# Patient Record
Sex: FEMALE | Race: WHITE | NOT HISPANIC OR LATINO | Employment: OTHER | URBAN - METROPOLITAN AREA
[De-identification: names, ages, dates, MRNs, and addresses within clinical notes are randomized per-mention and may not be internally consistent; named-entity substitution may affect disease eponyms.]

---

## 2023-08-25 ENCOUNTER — HOSPITAL ENCOUNTER (OUTPATIENT)
Dept: DATA CONVERSION | Facility: HOSPITAL | Age: 78
Discharge: HOME | End: 2023-08-25

## 2023-08-25 DIAGNOSIS — R05.9 COUGH, UNSPECIFIED: ICD-10-CM

## 2024-01-22 ENCOUNTER — APPOINTMENT (OUTPATIENT)
Dept: PRIMARY CARE | Facility: CLINIC | Age: 79
End: 2024-01-22
Payer: MEDICARE

## 2024-01-24 ENCOUNTER — OFFICE VISIT (OUTPATIENT)
Dept: PRIMARY CARE | Facility: CLINIC | Age: 79
End: 2024-01-24
Payer: MEDICARE

## 2024-01-24 VITALS
HEIGHT: 63 IN | WEIGHT: 232 LBS | DIASTOLIC BLOOD PRESSURE: 80 MMHG | HEART RATE: 82 BPM | BODY MASS INDEX: 41.11 KG/M2 | OXYGEN SATURATION: 98 % | SYSTOLIC BLOOD PRESSURE: 134 MMHG

## 2024-01-24 DIAGNOSIS — E78.2 MIXED HYPERLIPIDEMIA: ICD-10-CM

## 2024-01-24 DIAGNOSIS — Z78.0 POSTMENOPAUSAL: ICD-10-CM

## 2024-01-24 DIAGNOSIS — Z11.59 ENCOUNTER FOR HEPATITIS C SCREENING TEST FOR LOW RISK PATIENT: ICD-10-CM

## 2024-01-24 DIAGNOSIS — E11.9 TYPE 2 DIABETES MELLITUS WITHOUT COMPLICATION, WITHOUT LONG-TERM CURRENT USE OF INSULIN (MULTI): ICD-10-CM

## 2024-01-24 DIAGNOSIS — Z12.11 ENCOUNTER FOR SCREENING FOR MALIGNANT NEOPLASM OF COLON: ICD-10-CM

## 2024-01-24 DIAGNOSIS — I10 PRIMARY HYPERTENSION: ICD-10-CM

## 2024-01-24 DIAGNOSIS — Z00.00 WELL ADULT EXAM: Primary | ICD-10-CM

## 2024-01-24 DIAGNOSIS — Z12.31 ENCOUNTER FOR SCREENING MAMMOGRAM FOR BREAST CANCER: ICD-10-CM

## 2024-01-24 PROCEDURE — 1159F MED LIST DOCD IN RCRD: CPT | Performed by: NURSE PRACTITIONER

## 2024-01-24 PROCEDURE — 3075F SYST BP GE 130 - 139MM HG: CPT | Performed by: NURSE PRACTITIONER

## 2024-01-24 PROCEDURE — 93000 ELECTROCARDIOGRAM COMPLETE: CPT | Performed by: NURSE PRACTITIONER

## 2024-01-24 PROCEDURE — 3079F DIAST BP 80-89 MM HG: CPT | Performed by: NURSE PRACTITIONER

## 2024-01-24 PROCEDURE — 1126F AMNT PAIN NOTED NONE PRSNT: CPT | Performed by: NURSE PRACTITIONER

## 2024-01-24 PROCEDURE — G0439 PPPS, SUBSEQ VISIT: HCPCS | Performed by: NURSE PRACTITIONER

## 2024-01-24 PROCEDURE — 1036F TOBACCO NON-USER: CPT | Performed by: NURSE PRACTITIONER

## 2024-01-24 RX ORDER — GLIMEPIRIDE 4 MG/1
4 TABLET ORAL EVERY 12 HOURS
COMMUNITY
End: 2024-02-13

## 2024-01-24 RX ORDER — PRAVASTATIN SODIUM 80 MG/1
80 TABLET ORAL DAILY
COMMUNITY
End: 2024-02-16 | Stop reason: SDUPTHER

## 2024-01-24 RX ORDER — SERTRALINE HYDROCHLORIDE 100 MG/1
200 TABLET, FILM COATED ORAL DAILY
COMMUNITY
Start: 2023-04-04 | End: 2024-02-13

## 2024-01-24 RX ORDER — HUMAN INSULIN 100 [IU]/ML
INJECTION, SUSPENSION SUBCUTANEOUS EVERY 24 HOURS
COMMUNITY
End: 2024-02-16 | Stop reason: ALTCHOICE

## 2024-01-24 ASSESSMENT — PATIENT HEALTH QUESTIONNAIRE - PHQ9
1. LITTLE INTEREST OR PLEASURE IN DOING THINGS: NOT AT ALL
SUM OF ALL RESPONSES TO PHQ9 QUESTIONS 1 AND 2: 0
2. FEELING DOWN, DEPRESSED OR HOPELESS: NOT AT ALL

## 2024-01-24 ASSESSMENT — PAIN SCALES - GENERAL: PAINLEVEL: 0-NO PAIN

## 2024-01-24 NOTE — PROGRESS NOTES
Subjective   Patient ID: Denia Tong is a 78 y.o. female who presents for Annual Exam (Cologuard 2018 negative. EKG 2020. ).    HPI   Presents for Annual Wellness Visit. PMHx, FMHx, SHx, and Social history reviewed and updated in chart. PHQ2 reviewed. Mini-cog test reviewed. Advanced Care planning reviewed.  Self assessment of Health - fair  Issues with ADLs - none  Issues with IADLs - none  Issues with home safety - none  Pt has hx of noncompliance - out of meds for past week  Hx of HTN, HPL, DM, depression    Diet is fair  does not exercise  works at [employer]    Hx of HTN/HPL/DM  Denies chest pain, sob, claudication  former smoker - cxr 2020. Refuses LDCT  checks glucose periodically  Eye exam Dr champagne    GYN: pap no longer indicated due to age  overdue for mammogram and dexa    Cologuard negative 4/18  overdue for repeat    Immunizations reviewed    No predraw bw     Review of Systems  Constitutional Symptoms: negative for fever, loss of appetite, headaches, fatigue.   Eyes: negative for loss and blurring of vision, double vision.   Ear, Nose, Mouth, Throat: negative for hearing loss, tinnitus, nasal congestion, rhinorrhea, nose bleeds, teeth problems, mouth sores, gum disease, dysphagia, sore throat.   Cardiovascular: negative for chest pain/pressure, palpitations, edema, claudication.   Respiratory: negative for shortness of breath, dyspnea on exertion, pain with breathing, coughing.   Breast: negative for tenderness, masses, gynecomastia.   Gastrointestinal: negative for anorexia, indigestion, nausea, vomiting, abdominal pain, change in bowel habits, diarrhea, constipation, hematochaezia, melena, blood in stool.    : Negative for urinary or vaginal complaints  Musculoskeletal: negative for joint pain, joint swelling, myalgias, cramps.   Integumentary: negative for change in mole, skin trouble or rash.   Neurological: negative for headache, numbness, tingling, weakness, tremors.   Psychiatric:  "negative for depression, anxiety.   Endocrine: negative for weight gain, heat or cold intolerance, polyuria, polydipsia, polyphagia.   Hematologic/Lymphatic: negative for bruising, abnormal bleeding, swollen glands.      Objective   /80   Pulse 82   Ht 1.6 m (5' 3\")   Wt 105 kg (232 lb)   LMP 01/01/1990 (Approximate)   SpO2 98%   BMI 41.10 kg/m²     Physical Exam    General Appearance: Comfortable. She is well nourished, and well developed. She is awake, alert, and oriented and appears her stated age. The patient is cooperative with exam.  Head: Hair pattern reveals a normal pattern for patient's age and The face shows no abnormalities.  Eyes: PERRLA, EOMI, conjunctiva and sclera clear. Extraocular muscle exam reveals EOMI.  Ears, Nose, Mouth, Throat: Bilateral canals are normal. Both tympanic membranes are pearly gray and landmarks normal.   NOSE: Nasal mucosa in both nostrils reveals no polyps, ulcerations, or lesions. Oral mucosa reveals no abnormalities and Teeth fair  Neck: Neck reveals supple, no adenopathy, no thyromegaly, or carotid bruits.  Chest: Lungs are clear to auscultation bilaterally with no wheezes, rales, or rhonchi.  Cardiovascular: RRR without MRG.  Breast: Defers  Abdomen: Abdomen is soft, NT, ND with no masses.  Genitourinary: Defers  Lymph Nodes: Bilateral axillary lymph nodes are unremarkable. Bilateral inguinal lymph nodes are unremarkable.  Musculoskeletal: 5/5 and equal strength in bilateral upper and lower extremities.  Skin: Skin reveals good turgor and no rashes.  Neurological: Neuro: Intact and non-focal. Cranial nerves II - XII are grossly intact.  Psychiatric: Patient has appropriate judgement. Patient has good insight. Patient's mood is appropriate    Assessment/Plan   Diagnoses and all orders for this visit:  Well adult exam  Continue current medications  healthy diet  DASH diet  exercise  Safety  Bw ordered  Dexa and mammogram ordered   Cologuard ordered  Await " results    Encounter for screening mammogram for breast cancer  -     CBC and Auto Differential; Future  -     Comprehensive Metabolic Panel; Future  -     Hemoglobin A1C; Future  -     Lipid Panel; Future  -     Urinalysis with Reflex Microscopic; Future  -     Albumin , Urine Random; Future  -     BI mammo bilateral screening tomosynthesis; Future    Postmenopausal  -     CBC and Auto Differential; Future  -     Comprehensive Metabolic Panel; Future  -     Hemoglobin A1C; Future  -     Lipid Panel; Future  -     Urinalysis with Reflex Microscopic; Future  -     Albumin , Urine Random; Future  -     XR DEXA bone density; Future  Type 2 diabetes mellitus without complication, without long-term current use of insulin (CMS/HCC)  -     CBC and Auto Differential; Future  -     Comprehensive Metabolic Panel; Future  -     Hemoglobin A1C; Future  -     Lipid Panel; Future  -     Urinalysis with Reflex Microscopic; Future  -     Albumin , Urine Random; Future  Mixed hyperlipidemia  -     CBC and Auto Differential; Future  -     Comprehensive Metabolic Panel; Future  -     Hemoglobin A1C; Future  -     Lipid Panel; Future  -     Urinalysis with Reflex Microscopic; Future  -     Albumin , Urine Random; Future  Primary hypertension  -     CBC and Auto Differential; Future  -     Comprehensive Metabolic Panel; Future  -     Hemoglobin A1C; Future  -     Lipid Panel; Future  -     Urinalysis with Reflex Microscopic; Future  -     Albumin , Urine Random; Future  -     ECG 12 Lead  Encounter for hepatitis C screening test for low risk patient  -     Hepatitis C antibody; Future  Agrees to testing  Encounter for screening for malignant neoplasm of colon  -     Cologuard® colon cancer screening; Future  Other orders  -     Follow Up In Primary Care - Other; Future

## 2024-02-12 ENCOUNTER — HOSPITAL ENCOUNTER (OUTPATIENT)
Dept: RADIOLOGY | Facility: CLINIC | Age: 79
End: 2024-02-12
Payer: MEDICARE

## 2024-02-12 ENCOUNTER — APPOINTMENT (OUTPATIENT)
Dept: RADIOLOGY | Facility: CLINIC | Age: 79
End: 2024-02-12
Payer: MEDICARE

## 2024-02-14 ENCOUNTER — HOSPITAL ENCOUNTER (OUTPATIENT)
Dept: RADIOLOGY | Facility: CLINIC | Age: 79
Discharge: HOME | End: 2024-02-14
Payer: MEDICARE

## 2024-02-14 VITALS — BODY MASS INDEX: 41.96 KG/M2 | HEIGHT: 62 IN | WEIGHT: 228 LBS

## 2024-02-14 DIAGNOSIS — Z12.31 ENCOUNTER FOR SCREENING MAMMOGRAM FOR BREAST CANCER: ICD-10-CM

## 2024-02-14 DIAGNOSIS — Z78.0 POSTMENOPAUSAL: ICD-10-CM

## 2024-02-14 PROCEDURE — 77067 SCR MAMMO BI INCL CAD: CPT

## 2024-02-14 PROCEDURE — 77085 DXA BONE DENSITY AXL VRT FX: CPT

## 2024-02-15 ENCOUNTER — LAB (OUTPATIENT)
Dept: LAB | Facility: LAB | Age: 79
End: 2024-02-15
Payer: MEDICARE

## 2024-02-15 DIAGNOSIS — Z11.59 ENCOUNTER FOR HEPATITIS C SCREENING TEST FOR LOW RISK PATIENT: ICD-10-CM

## 2024-02-15 DIAGNOSIS — I10 PRIMARY HYPERTENSION: ICD-10-CM

## 2024-02-15 DIAGNOSIS — Z78.0 POSTMENOPAUSAL: ICD-10-CM

## 2024-02-15 DIAGNOSIS — E78.2 MIXED HYPERLIPIDEMIA: ICD-10-CM

## 2024-02-15 DIAGNOSIS — E11.9 TYPE 2 DIABETES MELLITUS WITHOUT COMPLICATION, WITHOUT LONG-TERM CURRENT USE OF INSULIN (MULTI): ICD-10-CM

## 2024-02-15 DIAGNOSIS — Z12.31 ENCOUNTER FOR SCREENING MAMMOGRAM FOR BREAST CANCER: ICD-10-CM

## 2024-02-15 LAB
ALBUMIN SERPL-MCNC: 3.8 G/DL (ref 3.5–5)
ALP BLD-CCNC: 91 U/L (ref 35–125)
ALT SERPL-CCNC: 5 U/L (ref 5–40)
ANION GAP SERPL CALC-SCNC: 11 MMOL/L
APPEARANCE UR: CLEAR
AST SERPL-CCNC: 11 U/L (ref 5–40)
BASOPHILS # BLD AUTO: 0.06 X10*3/UL (ref 0–0.1)
BASOPHILS NFR BLD AUTO: 0.8 %
BILIRUB SERPL-MCNC: 0.3 MG/DL (ref 0.1–1.2)
BILIRUB UR STRIP.AUTO-MCNC: NEGATIVE MG/DL
BUN SERPL-MCNC: 17 MG/DL (ref 8–25)
CALCIUM SERPL-MCNC: 9.2 MG/DL (ref 8.5–10.4)
CHLORIDE SERPL-SCNC: 105 MMOL/L (ref 97–107)
CHOLEST SERPL-MCNC: 210 MG/DL (ref 133–200)
CHOLEST/HDLC SERPL: 4.9 {RATIO}
CO2 SERPL-SCNC: 27 MMOL/L (ref 24–31)
COLOR UR: NORMAL
CREAT SERPL-MCNC: 0.9 MG/DL (ref 0.4–1.6)
CREAT UR-MCNC: 102.6 MG/DL
EGFRCR SERPLBLD CKD-EPI 2021: 65 ML/MIN/1.73M*2
EOSINOPHIL # BLD AUTO: 0.2 X10*3/UL (ref 0–0.4)
EOSINOPHIL NFR BLD AUTO: 2.7 %
ERYTHROCYTE [DISTWIDTH] IN BLOOD BY AUTOMATED COUNT: 13.6 % (ref 11.5–14.5)
EST. AVERAGE GLUCOSE BLD GHB EST-MCNC: 237 MG/DL
GLUCOSE SERPL-MCNC: 102 MG/DL (ref 65–99)
GLUCOSE UR STRIP.AUTO-MCNC: NORMAL MG/DL
HBA1C MFR BLD: 9.9 %
HCT VFR BLD AUTO: 41.2 % (ref 36–46)
HCV AB SER QL: NONREACTIVE
HDLC SERPL-MCNC: 43 MG/DL
HGB BLD-MCNC: 13.1 G/DL (ref 12–16)
IMM GRANULOCYTES # BLD AUTO: 0.02 X10*3/UL (ref 0–0.5)
IMM GRANULOCYTES NFR BLD AUTO: 0.3 % (ref 0–0.9)
KETONES UR STRIP.AUTO-MCNC: NEGATIVE MG/DL
LDLC SERPL CALC-MCNC: 143 MG/DL (ref 65–130)
LEUKOCYTE ESTERASE UR QL STRIP.AUTO: NEGATIVE
LYMPHOCYTES # BLD AUTO: 1.85 X10*3/UL (ref 0.8–3)
LYMPHOCYTES NFR BLD AUTO: 24.9 %
MCH RBC QN AUTO: 27.6 PG (ref 26–34)
MCHC RBC AUTO-ENTMCNC: 31.8 G/DL (ref 32–36)
MCV RBC AUTO: 87 FL (ref 80–100)
MICROALBUMIN UR-MCNC: 131 MG/L (ref 0–23)
MICROALBUMIN/CREAT UR: 127.7 UG/MG CREAT
MONOCYTES # BLD AUTO: 0.55 X10*3/UL (ref 0.05–0.8)
MONOCYTES NFR BLD AUTO: 7.4 %
MUCOUS THREADS #/AREA URNS AUTO: ABNORMAL /LPF
NEUTROPHILS # BLD AUTO: 4.75 X10*3/UL (ref 1.6–5.5)
NEUTROPHILS NFR BLD AUTO: 63.9 %
NITRITE UR QL STRIP.AUTO: NEGATIVE
NRBC BLD-RTO: 0 /100 WBCS (ref 0–0)
PH UR STRIP.AUTO: 6.5 [PH]
PLATELET # BLD AUTO: 268 X10*3/UL (ref 150–450)
POTASSIUM SERPL-SCNC: 4.7 MMOL/L (ref 3.4–5.1)
PROT SERPL-MCNC: 6.9 G/DL (ref 5.9–7.9)
PROT UR STRIP.AUTO-MCNC: NORMAL MG/DL
RBC # BLD AUTO: 4.74 X10*6/UL (ref 4–5.2)
RBC # UR STRIP.AUTO: NEGATIVE /UL
RBC #/AREA URNS AUTO: ABNORMAL /HPF
SODIUM SERPL-SCNC: 143 MMOL/L (ref 133–145)
SP GR UR STRIP.AUTO: 1.02
SQUAMOUS #/AREA URNS AUTO: ABNORMAL /HPF
TRIGL SERPL-MCNC: 121 MG/DL (ref 40–150)
UROBILINOGEN UR STRIP.AUTO-MCNC: NORMAL MG/DL
WBC # BLD AUTO: 7.4 X10*3/UL (ref 4.4–11.3)
WBC #/AREA URNS AUTO: ABNORMAL /HPF

## 2024-02-15 PROCEDURE — 36415 COLL VENOUS BLD VENIPUNCTURE: CPT

## 2024-02-15 PROCEDURE — 82570 ASSAY OF URINE CREATININE: CPT

## 2024-02-15 PROCEDURE — 83036 HEMOGLOBIN GLYCOSYLATED A1C: CPT

## 2024-02-15 PROCEDURE — 81001 URINALYSIS AUTO W/SCOPE: CPT

## 2024-02-15 PROCEDURE — 80061 LIPID PANEL: CPT

## 2024-02-15 PROCEDURE — 82043 UR ALBUMIN QUANTITATIVE: CPT

## 2024-02-15 PROCEDURE — 86803 HEPATITIS C AB TEST: CPT

## 2024-02-15 PROCEDURE — 85025 COMPLETE CBC W/AUTO DIFF WBC: CPT

## 2024-02-15 PROCEDURE — 80053 COMPREHEN METABOLIC PANEL: CPT

## 2024-02-16 ENCOUNTER — OFFICE VISIT (OUTPATIENT)
Dept: PRIMARY CARE | Facility: CLINIC | Age: 79
End: 2024-02-16
Payer: MEDICARE

## 2024-02-16 ENCOUNTER — TELEPHONE (OUTPATIENT)
Dept: PRIMARY CARE | Facility: CLINIC | Age: 79
End: 2024-02-16

## 2024-02-16 ENCOUNTER — CLINICAL SUPPORT (OUTPATIENT)
Dept: PRIMARY CARE | Facility: CLINIC | Age: 79
End: 2024-02-16
Payer: MEDICARE

## 2024-02-16 VITALS
WEIGHT: 234 LBS | DIASTOLIC BLOOD PRESSURE: 78 MMHG | OXYGEN SATURATION: 96 % | BODY MASS INDEX: 42.8 KG/M2 | HEART RATE: 74 BPM | SYSTOLIC BLOOD PRESSURE: 144 MMHG

## 2024-02-16 DIAGNOSIS — E11.9 TYPE 2 DIABETES MELLITUS WITHOUT COMPLICATION, WITH LONG-TERM CURRENT USE OF INSULIN (MULTI): ICD-10-CM

## 2024-02-16 DIAGNOSIS — Z79.4 TYPE 2 DIABETES MELLITUS WITH DIABETIC NEUROPATHY, WITH LONG-TERM CURRENT USE OF INSULIN (MULTI): Primary | ICD-10-CM

## 2024-02-16 DIAGNOSIS — E11.40 TYPE 2 DIABETES MELLITUS WITH DIABETIC NEUROPATHY, WITH LONG-TERM CURRENT USE OF INSULIN (MULTI): Primary | ICD-10-CM

## 2024-02-16 DIAGNOSIS — Z79.4 TYPE 2 DIABETES MELLITUS WITHOUT COMPLICATION, WITH LONG-TERM CURRENT USE OF INSULIN (MULTI): ICD-10-CM

## 2024-02-16 DIAGNOSIS — E11.9 TYPE 2 DIABETES MELLITUS WITHOUT COMPLICATION, UNSPECIFIED WHETHER LONG TERM INSULIN USE (MULTI): ICD-10-CM

## 2024-02-16 DIAGNOSIS — M85.852 OSTEOPENIA OF LEFT HIP: Primary | ICD-10-CM

## 2024-02-16 DIAGNOSIS — I10 PRIMARY HYPERTENSION: ICD-10-CM

## 2024-02-16 PROCEDURE — 1036F TOBACCO NON-USER: CPT | Performed by: NURSE PRACTITIONER

## 2024-02-16 PROCEDURE — 1126F AMNT PAIN NOTED NONE PRSNT: CPT | Performed by: NURSE PRACTITIONER

## 2024-02-16 PROCEDURE — 1158F ADVNC CARE PLAN TLK DOCD: CPT | Performed by: NURSE PRACTITIONER

## 2024-02-16 PROCEDURE — 3077F SYST BP >= 140 MM HG: CPT | Performed by: NURSE PRACTITIONER

## 2024-02-16 PROCEDURE — 1123F ACP DISCUSS/DSCN MKR DOCD: CPT | Performed by: NURSE PRACTITIONER

## 2024-02-16 PROCEDURE — 1159F MED LIST DOCD IN RCRD: CPT | Performed by: NURSE PRACTITIONER

## 2024-02-16 PROCEDURE — 3078F DIAST BP <80 MM HG: CPT | Performed by: NURSE PRACTITIONER

## 2024-02-16 PROCEDURE — 99214 OFFICE O/P EST MOD 30 MIN: CPT | Performed by: NURSE PRACTITIONER

## 2024-02-16 RX ORDER — ALENDRONATE SODIUM 70 MG/1
70 TABLET ORAL
Qty: 4 TABLET | Refills: 11 | Status: SHIPPED | OUTPATIENT
Start: 2024-02-16 | End: 2025-02-15

## 2024-02-16 ASSESSMENT — PAIN SCALES - GENERAL: PAINLEVEL: 0-NO PAIN

## 2024-02-16 NOTE — PROGRESS NOTES
Diabetes Management  E11.9    HPI  Denia Tong is a 78 y.o. female who presents for a follow-up evaluation of her Type 2 diabetes mellitus.   Referring Provider: Britany Dudley, JANKI-CNP     Pt reports neuropathy in feet and lower legs.  Duration 6 months.      Pt reports no fridge for 3 years.  She works at [employer] as a .  She reports that she typically skips breakfast and eats other meals at [employer] daily.  When not at work she eats prepackaged meals.    Previously ordered Libre2 reader and sensors, however pt never followed up for CGM start appointment.  Pt states that she can't find where she placed the supplies.  She brings no supplies with her today.      CURRENT DM PHARMACOTHERAPY  Toujeo 50 units daily   Glimepiride 4mg bid    LAB REVIEW   Lab Results   Component Value Date    HGBA1C 9.9 (H) 02/15/2024    HGBA1C 8.6 (H) 05/26/2023    HGBA1C 10.5 (H) 08/05/2020     Lab Results   Component Value Date    CREATININE 0.90 02/15/2024    GLUCOSE 102 (H) 02/15/2024    EGFR 65 02/15/2024     Lab Results   Component Value Date    TRIG 121 02/15/2024    CHOL 210 (H) 02/15/2024    LDLCALC 143 (H) 02/15/2024    HDL 43.0 (L) 02/15/2024       HISTORICAL PHARMACOTHERAPY  Current Outpatient Medications on File Prior to Visit   Medication Sig Dispense Refill    glimepiride (Amaryl) 4 mg tablet Take 1 tablet by mouth twice daily 180 tablet 0    insulin glargine (Toujeo SoloStar U-300 Insulin) 300 unit/mL (1.5 mL) injection INJECT 50 UNITS SUBCUTANEOUSLY ONCE DAILY 6 mL 3    insulin NPH, Isophane, (NovoLIN N NPH U-100 Insulin) 100 unit/mL injection once every 24 hours.      losartan (Cozaar) 50 mg tablet Take 1 tablet by mouth once daily 90 tablet 0    pravastatin (Pravachol) 40 mg tablet Take 1 tablet by mouth once daily 90 tablet 0    pravastatin (Pravachol) 80 mg tablet Take 1 tablet (80 mg) by mouth once daily.      sertraline (Zoloft) 100 mg tablet Take 2 tablets by mouth once daily 180 tablet 0     [DISCONTINUED] glimepiride (Amaryl) 4 mg tablet Take 1 tablet (4 mg) by mouth every 12 hours.      [DISCONTINUED] sertraline (Zoloft) 100 mg tablet Take 2 tablets (200 mg) by mouth once daily.       No current facility-administered medications on file prior to visit.       SMBG  Not testing at home     RECOMMENDATIONS/PLAN  Pt diabetes is poorly controlled with most recent A1c of 9.9% (goal < 7 %).   Uncertain if patient is compliant with medications. She is unable to tell me how many units of insulin she uses daily.  She believes it might be 50 units.   2.    CGM being the best way to determine pt need for insulin.  Pt will look at home and see if she can find the supplies.  Instructed to contact me if supplies are not located.  I can attempt to order replacements.    3.  Discussed reduction of CHO foods in diet.  Instructed pt to avoid sugary drinks and french fries. Provided pt with list of menu items from [employer] that contain <45g carbs.        Plan:  Call office to schedule appt with clinical pharmacist once supplies are located.    Reduce carbohydrates in diet.        Treatment and plan discussed with ISIDRO LEVINE [facility], [facility], [facility]    Verbal consent to manage patient's drug therapy was obtained from the patient or an individual authorized to act on behalf of the patient.  Patient was informed they may decline to participate or withdraw from participation in pharmacy services at any time.

## 2024-02-22 NOTE — PROGRESS NOTES
Subjective   Patient ID: Denia Tong is a 78 y.o. female who presents for Follow-up (Mammogram, DEXA, and lab results. ).    HPI   Denia is a 79 yo F who presents for testing follow up  She met with Clinical pharmacist Lilliana regarding DM prior to this visit.  She will be intiated on CGM and diet was reviewed. Pt will be more compliant with her meds.     Mammogram done and normal    Dexa shows osteopenia of left hip  T4-7 shows mild wedging  10 year fracture risk for hip 3.1%    Review of Systems  Constitutional Symptoms: Negative for fever, loss of appetite, headaches, fatigue.   Cardiovascular: Negative for chest pain/pressure, palpitations, edema  Respiratory: Negative for shortness of breath, dyspnea on exertion, pain with breathing, coughing.   Gastrointestinal: Negative for nausea, vomiting, abdominal pain, change in bowel habits  Musculoskeletal: Negative for joint pain, joint swelling, myalgias, cramps.   Integumentary: Negative for skin trouble or rash.   Neurological: Negative for headache, numbness, tingling, weakness, tremors.   Psychiatric: Negative for depression, anxiety.   Endocrine: Negative for weight gain, heat or cold intolerance, polyuria, polydipsia, polyphagia.   Hematologic/Lymphatic: Negative for bruising, abnormal bleeding, swollen glands.     Objective   /78   Pulse 74   Wt 106 kg (234 lb)   LMP 01/01/1990 (Approximate)   SpO2 96%   BMI 42.80 kg/m²     Physical Exam  alert and oriented x3, NAD  Neck supple with no JVD  Lungs CTA bilaterally  Heart with RRR with no edema.  Abd obese, soft NT/ND  skin warm and dry  Neuro grossly intact     Assessment/Plan   Diagnoses and all orders for this visit:  Osteopenia of left hip  -     alendronate (Fosamax) 70 mg tablet; Take 1 tablet (70 mg) by mouth every 7 days. Take in the morning with a full glass of water, on an empty stomach, and do not take anything else by mouth or lie down for the next 30 min.  Due to test results and fracture  risk, start meds weekly  Healthy diet, calcium/vit d, limit caffeine and alcohol  Exercise  Recheck 2 years  Type 2 diabetes mellitus without complication, with long-term current use of insulin (CMS/[facility])  Compliance with medications as directed  Diabetic education given  Check glucose  Follow up 1 month  Primary hypertension  Compliance with meds as directed  Mediterranean diet   No added salt  Monitor and record  Follow up 1 month

## 2024-02-27 DIAGNOSIS — R19.5 POSITIVE COLORECTAL CANCER SCREENING USING COLOGUARD TEST: ICD-10-CM

## 2024-02-27 LAB — NONINV COLON CA DNA+OCC BLD SCRN STL QL: POSITIVE

## 2024-03-20 DIAGNOSIS — Z79.4 TYPE 2 DIABETES MELLITUS WITH OTHER SPECIFIED COMPLICATION, WITH LONG-TERM CURRENT USE OF INSULIN (MULTI): ICD-10-CM

## 2024-03-20 DIAGNOSIS — E11.69 TYPE 2 DIABETES MELLITUS WITH OTHER SPECIFIED COMPLICATION, WITH LONG-TERM CURRENT USE OF INSULIN (MULTI): ICD-10-CM

## 2024-03-20 RX ORDER — INSULIN GLARGINE 300 U/ML
50 INJECTION, SOLUTION SUBCUTANEOUS DAILY
Qty: 6 ML | Refills: 0 | Status: SHIPPED | OUTPATIENT
Start: 2024-03-20 | End: 2024-06-03

## 2024-03-22 ENCOUNTER — HOSPITAL ENCOUNTER (OUTPATIENT)
Dept: RADIOLOGY | Facility: EXTERNAL LOCATION | Age: 79
Discharge: HOME | End: 2024-03-22
Payer: MEDICARE

## 2024-03-22 DIAGNOSIS — M25.562 ACUTE PAIN OF LEFT KNEE: ICD-10-CM

## 2024-03-25 PROBLEM — F32.9 MAJOR DEPRESSIVE DISORDER WITH SINGLE EPISODE: Status: ACTIVE | Noted: 2024-03-25

## 2024-03-25 PROBLEM — R05.9 COUGH: Status: ACTIVE | Noted: 2020-09-01

## 2024-03-25 PROBLEM — M17.9 OSTEOARTHRITIS OF KNEE: Status: ACTIVE | Noted: 2023-05-19

## 2024-03-25 PROBLEM — M54.50 LOW BACK PAIN: Status: ACTIVE | Noted: 2022-05-20

## 2024-03-25 PROBLEM — M85.80 OSTEOPENIA: Status: ACTIVE | Noted: 2024-03-25

## 2024-03-25 PROBLEM — Z91.199 NONCOMPLIANCE WITH TREATMENT: Status: ACTIVE | Noted: 2023-05-26

## 2024-03-25 PROBLEM — M65.30 ACQUIRED TRIGGER FINGER: Status: ACTIVE | Noted: 2024-03-25

## 2024-03-25 PROBLEM — S83.90XA SPRAIN OF KNEE: Status: ACTIVE | Noted: 2023-05-19

## 2024-03-25 PROBLEM — M25.569 KNEE PAIN: Status: ACTIVE | Noted: 2023-05-19

## 2024-03-25 RX ORDER — FLASH GLUCOSE SCANNING READER
EACH MISCELLANEOUS
COMMUNITY
Start: 2023-08-25

## 2024-03-25 RX ORDER — FLUTICASONE PROPIONATE 110 UG/1
AEROSOL, METERED RESPIRATORY (INHALATION)
COMMUNITY
Start: 2020-09-01

## 2024-03-25 RX ORDER — ALBUTEROL SULFATE 90 UG/1
AEROSOL, METERED RESPIRATORY (INHALATION)
COMMUNITY
Start: 2023-08-29

## 2024-03-25 RX ORDER — PREDNISOLONE ACETATE 10 MG/ML
SUSPENSION/ DROPS OPHTHALMIC
COMMUNITY
Start: 2024-02-05

## 2024-03-25 RX ORDER — TIMOLOL MALEATE 5 MG/ML
SOLUTION/ DROPS OPHTHALMIC
COMMUNITY
Start: 2024-02-12

## 2024-03-26 ENCOUNTER — OFFICE VISIT (OUTPATIENT)
Dept: ORTHOPEDIC SURGERY | Facility: CLINIC | Age: 79
End: 2024-03-26
Payer: MEDICARE

## 2024-03-26 VITALS — WEIGHT: 233.69 LBS | BODY MASS INDEX: 42.74 KG/M2

## 2024-03-26 DIAGNOSIS — M79.89 LEFT LEG SWELLING: ICD-10-CM

## 2024-03-26 DIAGNOSIS — M71.22 BAKER'S CYST OF KNEE, LEFT: ICD-10-CM

## 2024-03-26 DIAGNOSIS — M17.12 LOCALIZED OSTEOARTHRITIS OF LEFT KNEE: ICD-10-CM

## 2024-03-26 DIAGNOSIS — M25.562 LEFT KNEE PAIN, UNSPECIFIED CHRONICITY: Primary | ICD-10-CM

## 2024-03-26 PROCEDURE — 1160F RVW MEDS BY RX/DR IN RCRD: CPT | Performed by: PHYSICIAN ASSISTANT

## 2024-03-26 PROCEDURE — 1159F MED LIST DOCD IN RCRD: CPT | Performed by: PHYSICIAN ASSISTANT

## 2024-03-26 PROCEDURE — 1036F TOBACCO NON-USER: CPT | Performed by: PHYSICIAN ASSISTANT

## 2024-03-26 PROCEDURE — 99213 OFFICE O/P EST LOW 20 MIN: CPT | Performed by: PHYSICIAN ASSISTANT

## 2024-03-26 PROCEDURE — 1125F AMNT PAIN NOTED PAIN PRSNT: CPT | Performed by: PHYSICIAN ASSISTANT

## 2024-03-26 PROCEDURE — 1123F ACP DISCUSS/DSCN MKR DOCD: CPT | Performed by: PHYSICIAN ASSISTANT

## 2024-03-26 ASSESSMENT — PATIENT HEALTH QUESTIONNAIRE - PHQ9
2. FEELING DOWN, DEPRESSED OR HOPELESS: NOT AT ALL
SUM OF ALL RESPONSES TO PHQ9 QUESTIONS 1 AND 2: 0
1. LITTLE INTEREST OR PLEASURE IN DOING THINGS: NOT AT ALL

## 2024-03-26 ASSESSMENT — PAIN DESCRIPTION - DESCRIPTORS: DESCRIPTORS: ACHING

## 2024-03-26 ASSESSMENT — ENCOUNTER SYMPTOMS
LOSS OF SENSATION IN FEET: 0
DEPRESSION: 0
OCCASIONAL FEELINGS OF UNSTEADINESS: 0

## 2024-03-26 ASSESSMENT — PAIN SCALES - GENERAL: PAINLEVEL_OUTOF10: 8

## 2024-03-26 ASSESSMENT — PAIN - FUNCTIONAL ASSESSMENT: PAIN_FUNCTIONAL_ASSESSMENT: 0-10

## 2024-03-26 NOTE — LETTER
March 26, 2024     Patient: Denia Tong   YOB: 1945   Date of Visit: 3/26/2024       To Whom It May Concern:    It is my medical opinion that Denia Tong may return to work on April 8th 2024 .    If you have any questions or concerns, please don't hesitate to call.         Sincerely,        Loan Contreras PA-C    CC: No Recipients

## 2024-03-26 NOTE — PROGRESS NOTES
Subjective      Chief Complaint   Patient presents with    Left Knee - Pain      No surgery found     HPI  This 78 year old patient presents today with  left knee pain. The patient states that this left knee pain has been present after a twisting injury several days ago. She went to Urgent care as she had difficulty bearing weight and x-rays showed no acute fracture. She is using a walker while walking for support. The patient rates the knee pain as 8. The patient states that knee pain is worse with and aggravated by activity and bearing weight. The patient states the knee is giving way and locking. She is unable to resume her normal activities of daily living including working. The patient has tried ibuprofen and tylenol with no relief. Patient requests a discussion of further treatment options on examination today.     CARDIOLOGY:   Negative for chest pain, shortness of breath.   RESPIRATORY:   Negative for chest pain, shortness of breath.   MUSCULOSKELETAL:   See HPI for details.   NEUROLOGY:   Negative for tingling, numbness, weakness.    Objective    There were no vitals filed for this visit.    Knee Exam  Constitutional: Appears stated age. No apparent distress  Labored Breathing: No  Psychiatric: Normal mood and effect.   Neurological: alert and oriented x3  Skin: intact  MUSCULOSKELETAL: Neck: No tenderness. No pain or limitation with range of motion. Back: No tenderness. Straight leg test negative bilaterally. left knee: There is diffuse tenderness over the knee. diminished range of motion from 5-110 degrees. There is a palpable Baker's cyst. McMurrays is negative. Anterior drawer and lachmans are negative. There is not an effusion present. There is no tenderness in the left calf, but mild swelling. The knee is stable to valgus and varus stressing. The patient walks with a painful gait favoring the left knee while walking usiing a walker for support.    Urgent Care Xray    Result Date:  3/22/2024  Interpreted By:  Hugo Patiño, STUDY: XR URGENT CARE XRAY   INDICATION: Signs/Symptoms:left knee pain.   COMPARISON: None   ACCESSION NUMBER(S): [id number]   ORDERING CLINICIAN: GISSELL SULLIVAN   FINDINGS: Four views left knee.   Moderate osteoarthritis.   No evidence of fracture or malalignment.       Moderate osteoarthritis left knee. No acute findings   Signed by: Hugo Patiño 3/22/2024 2:04 PM Dictation workstation:   [id number]      Denia was seen today for pain.  Diagnoses and all orders for this visit:  Left knee pain, unspecified chronicity (Primary)  Localized osteoarthritis of left knee  Left leg swelling  -     Lower extremity venous duplex left; Future  Baker's cyst of knee, left  -     Lower extremity venous duplex left; Future  Options are discussed with the patient in detail.  An ultrasound of the left lower extremity is ordered in the office today to further evaluate this patient's left knee Baker's cyst and swelling.  She is having difficulty bearing weight.  The patient is instructed regarding activity modification and risk for further injury with falling or trauma and to use a walker while walking for support, ice, provider directed at home gentle strengthening and ROM exercises, and the appropriate use of Tylenol as needed for pain with its potential adverse reactions and side effects. The patient understands.  Return after ultrasound is complete or sooner as needed I estimate that this patient is able to return to work on April 8 and is given a note regarding this in office today please note that this report has been produced using speech recognition software.  It may contain errors related to grammar, punctuation or spelling.  Electronically signed, but not reviewed.    Loan Contreras PA-C

## 2024-03-26 NOTE — PATIENT INSTRUCTIONS
Thank you for coming to see us today!     Continue to rest, ice, and elevate  Tylenol for pain control  We are ordering an Ultrasound in office today.     Please call central scheduling to schedule your ultrasound  Once you have a date for your ultrasound, call our office to schedule a follow up with Dr. Leavitt

## 2024-03-27 ENCOUNTER — ANCILLARY PROCEDURE (OUTPATIENT)
Dept: VASCULAR MEDICINE | Facility: CLINIC | Age: 79
End: 2024-03-27
Payer: MEDICARE

## 2024-03-27 DIAGNOSIS — R60.0 LOCALIZED EDEMA: ICD-10-CM

## 2024-03-27 DIAGNOSIS — M79.89 LEFT LEG SWELLING: ICD-10-CM

## 2024-03-27 DIAGNOSIS — M71.22 BAKER'S CYST OF KNEE, LEFT: ICD-10-CM

## 2024-03-27 PROCEDURE — 93971 EXTREMITY STUDY: CPT

## 2024-03-27 PROCEDURE — 93971 EXTREMITY STUDY: CPT | Performed by: INTERNAL MEDICINE

## 2024-04-03 ENCOUNTER — TELEPHONE (OUTPATIENT)
Dept: ORTHOPEDIC SURGERY | Facility: CLINIC | Age: 79
End: 2024-04-03
Payer: MEDICARE

## 2024-04-03 NOTE — TELEPHONE ENCOUNTER
Patient called, she had her Ultra Sound done.  She would like a call back with results.  BUT she states she has an order for a MRI and should she get that done as well before she comes in.    I can't find/see the order for this.   Please advice

## 2024-04-03 NOTE — TELEPHONE ENCOUNTER
Spoke to patient.  Made follow up appt.  The fact that the patient thought she had an MRI order was a mistake.

## 2024-04-09 ENCOUNTER — OFFICE VISIT (OUTPATIENT)
Dept: ORTHOPEDIC SURGERY | Facility: CLINIC | Age: 79
End: 2024-04-09
Payer: MEDICARE

## 2024-04-09 VITALS — WEIGHT: 233.69 LBS | BODY MASS INDEX: 42.74 KG/M2

## 2024-04-09 DIAGNOSIS — M17.12 LOCALIZED OSTEOARTHRITIS OF LEFT KNEE: ICD-10-CM

## 2024-04-09 DIAGNOSIS — M25.562 LEFT KNEE PAIN, UNSPECIFIED CHRONICITY: Primary | ICD-10-CM

## 2024-04-09 PROCEDURE — 99213 OFFICE O/P EST LOW 20 MIN: CPT | Performed by: PHYSICIAN ASSISTANT

## 2024-04-09 PROCEDURE — 1159F MED LIST DOCD IN RCRD: CPT | Performed by: PHYSICIAN ASSISTANT

## 2024-04-09 PROCEDURE — 1160F RVW MEDS BY RX/DR IN RCRD: CPT | Performed by: PHYSICIAN ASSISTANT

## 2024-04-09 PROCEDURE — 1123F ACP DISCUSS/DSCN MKR DOCD: CPT | Performed by: PHYSICIAN ASSISTANT

## 2024-04-09 PROCEDURE — 1036F TOBACCO NON-USER: CPT | Performed by: PHYSICIAN ASSISTANT

## 2024-04-09 ASSESSMENT — LIFESTYLE VARIABLES: TOTAL SCORE: 0

## 2024-04-09 ASSESSMENT — PAIN SCALES - GENERAL: PAINLEVEL_OUTOF10: 5 - MODERATE PAIN

## 2024-04-09 ASSESSMENT — PAIN - FUNCTIONAL ASSESSMENT: PAIN_FUNCTIONAL_ASSESSMENT: 0-10

## 2024-04-09 ASSESSMENT — ENCOUNTER SYMPTOMS
OCCASIONAL FEELINGS OF UNSTEADINESS: 0
DEPRESSION: 0
LOSS OF SENSATION IN FEET: 0

## 2024-04-09 ASSESSMENT — PATIENT HEALTH QUESTIONNAIRE - PHQ9
SUM OF ALL RESPONSES TO PHQ9 QUESTIONS 1 AND 2: 0
1. LITTLE INTEREST OR PLEASURE IN DOING THINGS: NOT AT ALL
2. FEELING DOWN, DEPRESSED OR HOPELESS: NOT AT ALL

## 2024-04-09 ASSESSMENT — PAIN DESCRIPTION - DESCRIPTORS: DESCRIPTORS: ACHING

## 2024-04-09 NOTE — PROGRESS NOTES
Subjective      Chief Complaint   Patient presents with    Left Knee - Follow-up      No surgery found     HPI  This 78 year old patient presents today for follow-up for left knee pain. The patient states that this left knee pain has significantly improved. She went to Urgent care as she had difficulty bearing weight and x-rays showed no acute fracture. She is using a walker while walking for support. The patient rates the knee pain as 4.  The patient states that she is resuming her normal activities of daily living.  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  Ultrasound left lower extremity is negative for DVT.  Patient requests a discussion of further treatment options on examination today.     CARDIOLOGY:   Negative for chest pain, shortness of breath.   RESPIRATORY:   Negative for chest pain, shortness of breath.   MUSCULOSKELETAL:   See HPI for details.   NEUROLOGY:   Negative for tingling, numbness, weakness.    Objective    There were no vitals filed for this visit.    Knee Exam  Constitutional: Appears stated age. No apparent distress  Labored Breathing: No  Psychiatric: Normal mood and effect.   Neurological: alert and oriented x3  Skin: intact  MUSCULOSKELETAL: Neck: No tenderness. No pain or limitation with range of motion. Back: No tenderness. Straight leg test negative bilaterally. left knee: There is no tenderness over the knee.  Patient has painless range of motion from 0 to 110 degrees.  McMurrays is negative. Anterior drawer and lachmans are negative. There is not an effusion present. There is no tenderness in the left calf, and no swelling. The knee is stable to valgus and varus stressing. The patient walks with a stable gait favoring the left knee while walking usiing a walker for support.    Urgent Care Xray    Result Date: 3/22/2024  Interpreted By:  Hugo Patiño, STUDY: XR URGENT CARE XRAY   INDICATION: Signs/Symptoms:left  knee pain.   COMPARISON: None   ACCESSION NUMBER(S): [id number]   ORDERING CLINICIAN: GISSELL SULLIVAN   FINDINGS: Four views left knee.   Moderate osteoarthritis.   No evidence of fracture or malalignment.       Moderate osteoarthritis left knee. No acute findings   Signed by: Hugo Patiño 3/22/2024 2:04 PM Dictation workstation:   [id number]      Lower extremity venous duplex left    Result Date: 3/28/2024           [place] [address]            Phone [phone]  Vascular Lab Report  VAS US LOWER EXTREMITY VENOUS DUPLEX LEFT Patient Name:      ZAIRE JUAN DANIEL MOREJON       Reading Physician: [id number] [employer] MD Study Date:        3/27/2024           Ordering Provider: [id number] WENDY MORENO MRN/PID:           [id number]            Fellow: Accession#:        [id number]        Technologist:      Michaela Lopez RVLUBA Date of Birth/Age: 1945 / 78      Technologist 2:                    years Gender:            F                   Encounter#:        [id number] Admission Status:  Outpatient          Location           [facility]                                        Performed:  Diagnosis/ICD: Localized (leg) edema-R60.0 CPT Codes:     88542 Peripheral venous duplex scan for DVT Limited  CONCLUSIONS: Right Lower Venous: Right common femoral vein is negative for deep vein thrombus. Additional Findings; There is a non-vascular lymph node in the groin measuring 3.04 cm x 0.67 cm. Left Lower Venous: No evidence of acute deep vein thrombus visualized in the left lower extremity. The peroneal veins were visualized in segments due to edema. Technically difficult exam due to patient pain and edema. Additional Findings; There is a non-vascular lymph node in the groin measuring 1.53 cm x 0.57 cm.  Imaging & Doppler Findings:  Right Compressible Thrombus        Flow CFV       Yes        None   Spontaneous/Phasic   Left                  Compress Thrombus        Flow Distal External Iliac   Yes      None   Spontaneous/Phasic CFV                     Yes      None   Spontaneous/Phasic PFV                     Yes      None FV Proximal             Yes      None   Spontaneous/Phasic FV Mid                  Yes      None FV Distal               Yes      None Popliteal               Yes      None   Spontaneous/Phasic Peroneal                Yes      None PTV                     Yes      None  [id number] [employer] MD Electronically signed by [id number] [employer]en Painting MD on 3/28/2024 at 4:44:48 PM  ** Final **     Urgent Care Xray    Result Date: 3/22/2024  Interpreted By:  Hugo Patiño, STUDY: XR URGENT CARE XRAY   INDICATION: Signs/Symptoms:left knee pain.   COMPARISON: None   ACCESSION NUMBER(S): [id number]   ORDERING CLINICIAN: GISSELL SULLIVAN   FINDINGS: Four views left knee.   Moderate osteoarthritis.   No evidence of fracture or malalignment.       Moderate osteoarthritis left knee. No acute findings   Signed by: Hugo Patiño 3/22/2024 2:04 PM Dictation workstation:   [id number]      Denia was seen today for follow-up.  Diagnoses and all orders for this visit:  Localized osteoarthritis of left knee (Primary)  Options are discussed with the patient in detail. The patient is given a prescription for physical therapy to evaluate and treat with gentle strengthening and ROM exercises with modalities as needed. The patient is instructed regarding activity modification and risk for further injury with falling or trauma, ice, provider directed at home gentle strengthening and ROM exercises, and the appropriate use of Tylenol as needed for pain with its potential adverse reactions and side effects. The patient understands.  I estimate that this patient is able to return to work on May 6, 2024.  Return in 6 weeks for reevaluation or sooner as needed.  Please note that this report has been produced using speech recognition software.  It may  contain errors related to grammar, punctuation or spelling.  Electronically signed, but not reviewed.    DYANA VillatoroC

## 2024-04-09 NOTE — LETTER
April 9, 2024     Patient: Denia Tong   YOB: 1945   Date of Visit: 4/9/2024       To Whom It May Concern:    It is my medical opinion that Denia Tong may return to work on 5-6-2024 .    If you have any questions or concerns, please don't hesitate to call.         Sincerely,        Loan Contreras PA-C    CC: No Recipients

## 2024-04-09 NOTE — PATIENT INSTRUCTIONS
Thank you for coming to see us today!     Continue to use tylenol for pain control.   Rest, ice and elevate and remember to do exercises.   We are going to give you a referral for physical therapy.   Follow up as needed

## 2024-05-20 ENCOUNTER — APPOINTMENT (OUTPATIENT)
Dept: PRIMARY CARE | Facility: CLINIC | Age: 79
End: 2024-05-20
Payer: MEDICARE

## 2024-06-09 ENCOUNTER — HOSPITAL ENCOUNTER (EMERGENCY)
Facility: HOSPITAL | Age: 79
Discharge: HOME | End: 2024-06-09
Attending: EMERGENCY MEDICINE
Payer: MEDICARE

## 2024-06-09 ENCOUNTER — APPOINTMENT (OUTPATIENT)
Dept: RADIOLOGY | Facility: HOSPITAL | Age: 79
End: 2024-06-09
Payer: MEDICARE

## 2024-06-09 ENCOUNTER — APPOINTMENT (OUTPATIENT)
Dept: CARDIOLOGY | Facility: HOSPITAL | Age: 79
End: 2024-06-09
Payer: MEDICARE

## 2024-06-09 VITALS
TEMPERATURE: 96.8 F | BODY MASS INDEX: 42.8 KG/M2 | OXYGEN SATURATION: 98 % | WEIGHT: 232.59 LBS | SYSTOLIC BLOOD PRESSURE: 177 MMHG | DIASTOLIC BLOOD PRESSURE: 71 MMHG | HEIGHT: 62 IN | RESPIRATION RATE: 16 BRPM | HEART RATE: 88 BPM

## 2024-06-09 DIAGNOSIS — N39.0 ACUTE LOWER URINARY TRACT INFECTION: ICD-10-CM

## 2024-06-09 DIAGNOSIS — J18.9 PNEUMONIA OF LEFT LOWER LOBE DUE TO INFECTIOUS ORGANISM: Primary | ICD-10-CM

## 2024-06-09 LAB
ALBUMIN SERPL-MCNC: 3.9 G/DL (ref 3.5–5)
ALP BLD-CCNC: 84 U/L (ref 35–125)
ALT SERPL-CCNC: 11 U/L (ref 5–40)
ANION GAP SERPL CALC-SCNC: 13 MMOL/L
APPEARANCE UR: CLEAR
AST SERPL-CCNC: 18 U/L (ref 5–40)
BASOPHILS # BLD AUTO: 0.04 X10*3/UL (ref 0–0.1)
BASOPHILS NFR BLD AUTO: 0.3 %
BILIRUB SERPL-MCNC: 0.4 MG/DL (ref 0.1–1.2)
BILIRUB UR STRIP.AUTO-MCNC: NEGATIVE MG/DL
BUN SERPL-MCNC: 17 MG/DL (ref 8–25)
CALCIUM SERPL-MCNC: 9.2 MG/DL (ref 8.5–10.4)
CHLORIDE SERPL-SCNC: 103 MMOL/L (ref 97–107)
CO2 SERPL-SCNC: 24 MMOL/L (ref 24–31)
COLOR UR: ABNORMAL
CREAT SERPL-MCNC: 0.8 MG/DL (ref 0.4–1.6)
EGFRCR SERPLBLD CKD-EPI 2021: 75 ML/MIN/1.73M*2
EOSINOPHIL # BLD AUTO: 0.03 X10*3/UL (ref 0–0.4)
EOSINOPHIL NFR BLD AUTO: 0.3 %
ERYTHROCYTE [DISTWIDTH] IN BLOOD BY AUTOMATED COUNT: 14.6 % (ref 11.5–14.5)
GLUCOSE SERPL-MCNC: 69 MG/DL (ref 65–99)
GLUCOSE UR STRIP.AUTO-MCNC: NORMAL MG/DL
HCT VFR BLD AUTO: 42.4 % (ref 36–46)
HGB BLD-MCNC: 13.6 G/DL (ref 12–16)
IMM GRANULOCYTES # BLD AUTO: 0.04 X10*3/UL (ref 0–0.5)
IMM GRANULOCYTES NFR BLD AUTO: 0.3 % (ref 0–0.9)
KETONES UR STRIP.AUTO-MCNC: NEGATIVE MG/DL
LEUKOCYTE ESTERASE UR QL STRIP.AUTO: ABNORMAL
LYMPHOCYTES # BLD AUTO: 1.01 X10*3/UL (ref 0.8–3)
LYMPHOCYTES NFR BLD AUTO: 8.5 %
MCH RBC QN AUTO: 27.3 PG (ref 26–34)
MCHC RBC AUTO-ENTMCNC: 32.1 G/DL (ref 32–36)
MCV RBC AUTO: 85 FL (ref 80–100)
MONOCYTES # BLD AUTO: 0.43 X10*3/UL (ref 0.05–0.8)
MONOCYTES NFR BLD AUTO: 3.6 %
MUCOUS THREADS #/AREA URNS AUTO: ABNORMAL /LPF
NEUTROPHILS # BLD AUTO: 10.37 X10*3/UL (ref 1.6–5.5)
NEUTROPHILS NFR BLD AUTO: 87 %
NITRITE UR QL STRIP.AUTO: NEGATIVE
NRBC BLD-RTO: 0 /100 WBCS (ref 0–0)
PH UR STRIP.AUTO: 7 [PH]
PLATELET # BLD AUTO: 282 X10*3/UL (ref 150–450)
POTASSIUM SERPL-SCNC: 4.3 MMOL/L (ref 3.4–5.1)
PROT SERPL-MCNC: 7.6 G/DL (ref 5.9–7.9)
PROT UR STRIP.AUTO-MCNC: ABNORMAL MG/DL
RBC # BLD AUTO: 4.98 X10*6/UL (ref 4–5.2)
RBC # UR STRIP.AUTO: NEGATIVE /UL
RBC #/AREA URNS AUTO: ABNORMAL /HPF
SODIUM SERPL-SCNC: 140 MMOL/L (ref 133–145)
SP GR UR STRIP.AUTO: 1.01
TROPONIN T SERPL-MCNC: 18 NG/L
TROPONIN T SERPL-MCNC: 18 NG/L
UROBILINOGEN UR STRIP.AUTO-MCNC: NORMAL MG/DL
WBC # BLD AUTO: 11.9 X10*3/UL (ref 4.4–11.3)
WBC #/AREA URNS AUTO: ABNORMAL /HPF

## 2024-06-09 PROCEDURE — 2500000001 HC RX 250 WO HCPCS SELF ADMINISTERED DRUGS (ALT 637 FOR MEDICARE OP)

## 2024-06-09 PROCEDURE — 84484 ASSAY OF TROPONIN QUANT: CPT

## 2024-06-09 PROCEDURE — 71045 X-RAY EXAM CHEST 1 VIEW: CPT | Performed by: RADIOLOGY

## 2024-06-09 PROCEDURE — 93005 ELECTROCARDIOGRAM TRACING: CPT

## 2024-06-09 PROCEDURE — 85025 COMPLETE CBC W/AUTO DIFF WBC: CPT

## 2024-06-09 PROCEDURE — 70450 CT HEAD/BRAIN W/O DYE: CPT

## 2024-06-09 PROCEDURE — 80051 ELECTROLYTE PANEL: CPT

## 2024-06-09 PROCEDURE — 99285 EMERGENCY DEPT VISIT HI MDM: CPT | Mod: 25

## 2024-06-09 PROCEDURE — 36415 COLL VENOUS BLD VENIPUNCTURE: CPT

## 2024-06-09 PROCEDURE — 84484 ASSAY OF TROPONIN QUANT: CPT | Mod: 91

## 2024-06-09 PROCEDURE — 81001 URINALYSIS AUTO W/SCOPE: CPT

## 2024-06-09 PROCEDURE — 70450 CT HEAD/BRAIN W/O DYE: CPT | Performed by: STUDENT IN AN ORGANIZED HEALTH CARE EDUCATION/TRAINING PROGRAM

## 2024-06-09 PROCEDURE — 71045 X-RAY EXAM CHEST 1 VIEW: CPT

## 2024-06-09 PROCEDURE — 87086 URINE CULTURE/COLONY COUNT: CPT | Mod: WESLAB

## 2024-06-09 RX ORDER — DOXYCYCLINE 100 MG/1
100 TABLET ORAL 2 TIMES DAILY
Qty: 20 TABLET | Refills: 0 | Status: SHIPPED | OUTPATIENT
Start: 2024-06-09 | End: 2024-06-19

## 2024-06-09 RX ORDER — DOXYCYCLINE 100 MG/1
100 CAPSULE ORAL ONCE
Status: COMPLETED | OUTPATIENT
Start: 2024-06-09 | End: 2024-06-09

## 2024-06-09 RX ADMIN — DOXYCYCLINE HYCLATE 100 MG: 100 CAPSULE ORAL at 21:58

## 2024-06-09 ASSESSMENT — PAIN SCALES - GENERAL
PAINLEVEL_OUTOF10: 0 - NO PAIN
PAINLEVEL_OUTOF10: 0 - NO PAIN

## 2024-06-09 ASSESSMENT — PAIN - FUNCTIONAL ASSESSMENT: PAIN_FUNCTIONAL_ASSESSMENT: 0-10

## 2024-06-09 NOTE — ED PROVIDER NOTES
HPI   Chief Complaint   Patient presents with    Weakness, Gen     Pt presents to ED with generalized weakness after waking up from a nap. States that it was all over generalized weakness and an inability to speak, lasted a few minutes and has since resolved. PMH DM, BG was 74 for EMS. [facility] (-) for squad        Patient is a 78-year-old female past medical history of type 2 diabetes presenting with questionable TIA via EMS.  Patient states around 3 PM she went out for a nap and slept for roughly 1 hour.  States after that when she woke up she felt as if she had no control over her arms.  States that time she felt bilateral arm numbness.  Also states that she had some dysarthria and felt as if she was mumbling when trying to call for her cat.  States she felt generally weak and a difficult time getting up.  Symptoms did resolve by the time EMS was contacted and arrival to the ED.  Patient was able to ambulate off of the ED cot independently and get herself into bed.  States she does not use aspirin daily or blood thinners.  Patient denies fevers, chills, cough, sore throat, runny nose, chest pain, shortness of breath, abdominal pain, nausea, vomiting, diarrhea or urinary complaints.  Glucose per EMS was around 74 which patient states is slightly low for her.                          Shara Coma Scale Score: 15         NIH Stroke Scale: 0             Patient History   Past Medical History:   Diagnosis Date    Cataracts, bilateral     DM (diabetes mellitus) (Multi)     High cholesterol     History of right shoulder fracture     Hypertension     Left knee pain     Thoracic spine fracture (Multi)      No past surgical history on file.  Family History   Problem Relation Name Age of Onset    Lung cancer Mother      Brain cancer Father      Diabetes Sister       Social History     Tobacco Use    Smoking status: Former     Current packs/day: 0.00     Types: Cigarettes     Quit date:      Years since quittin.4     Smokeless tobacco: Never   Vaping Use    Vaping status: Never Used   Substance Use Topics    Alcohol use: Never    Drug use: Never       Physical Exam   ED Triage Vitals   Temp Pulse Resp BP   -- -- -- --      SpO2 Temp src Heart Rate Source Patient Position   -- -- -- --      BP Location FiO2 (%)     -- --       Physical Exam  Vitals and nursing note reviewed.   Constitutional:       Appearance: She is well-developed.      Comments: Awake, laying in examination bed   HENT:      Head: Normocephalic and atraumatic.      Nose: Nose normal.      Mouth/Throat:      Mouth: Mucous membranes are moist.      Pharynx: Oropharynx is clear.   Eyes:      Extraocular Movements: Extraocular movements intact.      Conjunctiva/sclera: Conjunctivae normal.      Pupils: Pupils are equal, round, and reactive to light.   Cardiovascular:      Rate and Rhythm: Normal rate and regular rhythm.      Pulses: Normal pulses.      Heart sounds: Normal heart sounds. No murmur heard.  Pulmonary:      Effort: Pulmonary effort is normal. No respiratory distress.      Breath sounds: Normal breath sounds.   Abdominal:      General: Abdomen is flat.      Palpations: Abdomen is soft.      Tenderness: There is no abdominal tenderness.   Musculoskeletal:         General: No swelling.      Cervical back: Normal range of motion and neck supple.   Skin:     General: Skin is warm and dry.      Capillary Refill: Capillary refill takes less than 2 seconds.   Neurological:      Mental Status: She is alert.   Psychiatric:         Mood and Affect: Mood normal.         ED Course & MDM   ED Course as of 06/09/24 2219   Sun Jun 09, 2024 2026 ECG 12 lead  ECG performed on June 9, 2024 at 1908 and interpreted by me at 1910 showing NSR, NAD, intervals within normal limits, no STEMI. No previous available for comparison [EG]   2027 CBC and Auto Differential(!)  High normal white blood cell count with left shift and otherwise unremarkable [EG]   2028 Troponin T Series,  High Sensitivity (0, 2HR, 6HR)(!!)  Just above the normal limits without previous for comparison [EG]   2028 Comprehensive metabolic panel  Within normal limits [EG]   2028 Urinalysis with Reflex Culture and Microscopic  Still pending collection [EG]   2101 XR chest 1 view  IMPRESSION:  1.  No evidence of acute cardiopulmonary process.   [EG]   2101 CT head wo IV contrast  IMPRESSION:  No acute intracranial hemorrhage, mass effect, or CT apparent acute  infarct. Chronic microvascular ischemia and involutional change.   [EG]      ED Course User Index  [EG] Phyllis Guevara MD         Diagnoses as of 06/09/24 2219   Pneumonia of left lower lobe due to infectious organism   Acute lower urinary tract infection       Medical Decision Making  Patient is a 78-year-old female past medical history of type 2 diabetes presenting with questionable TIA via EMS.  CBC, CMP, troponin, EKG, head CT, chest x-ray, UA ordered.  Conditions considered include but are not to: sleep paralysis, TIA, other intracranial pathology.  NIH Stroke scale 0.  Test of skew is negative.  There is no cerebral ataxia.    I saw this patient in conjunction with Dr. Guevara.  CBC does show leukocytosis with WBCs at 11.9 but is without signs of anemia.  CMP without significant electrolyte abnormality or renal impairment.  Initial troponin is elevated 18.  Repeat troponin stable at 18.  UA with micro shows possible signs of infection.  Chest x-ray reads without acute cardiopulmonary process, however upon further inspection myself and attending physician the seems that there may be some signs of left lower lobe pneumonia.  Oral doxycycline given.  CT head is without acute findings.    I believe this patient is at low risk for complication, and a disoposition of discharge is acceptable.  Return to the Emergency Department if new or worsening symptoms including headache, fever, chills, chest pain, shortness of breath, syncope, near syncope,  abdominal pain, nausea, vomiting,  diarrhea, or worsening pain.  Counseled patient that we will be treating her for pneumonia as well as possible UTI with doxycycline.  Discussed taking antibiotics to completion.  Patient is agreeable to disposition of discharge with follow-up with primary care and to take antibiotics to completion.    Portions of this note made with Dragon software, please be mindful of potential grammatical errors.        Medications   doxycycline (Vibramycin) capsule 100 mg (100 mg oral Given 6/9/24 2158)         Labs Reviewed   CBC WITH AUTO DIFFERENTIAL - Abnormal       Result Value    WBC 11.9 (*)     nRBC 0.0      RBC 4.98      Hemoglobin 13.6      Hematocrit 42.4      MCV 85      MCH 27.3      MCHC 32.1      RDW 14.6 (*)     Platelets 282      Neutrophils % 87.0      Immature Granulocytes %, Automated 0.3      Lymphocytes % 8.5      Monocytes % 3.6      Eosinophils % 0.3      Basophils % 0.3      Neutrophils Absolute 10.37 (*)     Immature Granulocytes Absolute, Automated 0.04      Lymphocytes Absolute 1.01      Monocytes Absolute 0.43      Eosinophils Absolute 0.03      Basophils Absolute 0.04     SERIAL TROPONIN, INITIAL ([facility]) - Abnormal    Troponin T, High Sensitivity 18 (*)    URINALYSIS WITH REFLEX CULTURE AND MICROSCOPIC - Abnormal    Color, Urine Light-Yellow      Appearance, Urine Clear      Specific Gravity, Urine 1.013      pH, Urine 7.0      Protein, Urine 50 (1+) (*)     Glucose, Urine Normal      Blood, Urine NEGATIVE      Ketones, Urine NEGATIVE      Bilirubin, Urine NEGATIVE      Urobilinogen, Urine Normal      Nitrite, Urine NEGATIVE      Leukocyte Esterase, Urine 75 Bony/µL (*)    SERIAL TROPONIN,  2 HOUR ([facility]) - Abnormal    Troponin T, High Sensitivity 18 (*)    MICROSCOPIC ONLY, URINE - Abnormal    WBC, Urine 11-20 (*)     RBC, Urine 1-2      Mucus, Urine FEW     COMPREHENSIVE METABOLIC PANEL - Normal    Glucose 69      Sodium 140      Potassium 4.3      Chloride 103       Bicarbonate 24      Urea Nitrogen 17      Creatinine 0.80      eGFR 75      Calcium 9.2      Albumin 3.9      Alkaline Phosphatase 84      Total Protein 7.6      AST 18      Bilirubin, Total 0.4      ALT 11      Anion Gap 13     URINE CULTURE   TROPONIN T SERIES, HIGH SENSITIVITY (0, 2 HR, 6 HR)    Narrative:     The following orders were created for panel order Troponin T Series, High Sensitivity (0, 2HR, 6HR).  Procedure                               Abnormality         Status                     ---------                               -----------         ------                     Serial Troponin, Initial...[[id number]]  Abnormal            Final result               Serial Troponin, 2 Hour ...[[id number]]  Abnormal            Final result               Serial Troponin, 6 Hour ...[[id number]]                                                   Please view results for these tests on the individual orders.   URINALYSIS WITH REFLEX CULTURE AND MICROSCOPIC    Narrative:     The following orders were created for panel order Urinalysis with Reflex Culture and Microscopic.  Procedure                               Abnormality         Status                     ---------                               -----------         ------                     Urinalysis with Reflex C...[[id number]]  Abnormal            Final result               Extra Urine Gray Tube[[id number]]                            In process                   Please view results for these tests on the individual orders.   EXTRA URINE GRAY TUBE   SERIAL TROPONIN, 6 HOUR ([facility])         CT head wo IV contrast   Final Result   No acute intracranial hemorrhage, mass effect, or CT apparent acute   infarct. Chronic microvascular ischemia and involutional change.        Signed by: Handy Perez 6/9/2024 8:27 PM   Dictation workstation:   [id number]      XR chest 1 view   Final Result   1.  No evidence of acute cardiopulmonary process.                  MACRO:   None         Signed by: Alirio Villagran 6/9/2024 8:13 PM   Dictation workstation:   [id number]            Procedure  Procedures     Alfredo Oliver PA-C  06/09/24 [id number]

## 2024-06-10 LAB — HOLD SPECIMEN: NORMAL

## 2024-06-11 LAB — BACTERIA UR CULT: NORMAL

## 2024-06-13 LAB
ATRIAL RATE: 91 BPM
P AXIS: 74 DEGREES
P OFFSET: 193 MS
P ONSET: 144 MS
PR INTERVAL: 166 MS
Q ONSET: 227 MS
QRS COUNT: 15 BEATS
QRS DURATION: 84 MS
QT INTERVAL: 348 MS
QTC CALCULATION(BAZETT): 428 MS
QTC FREDERICIA: 400 MS
R AXIS: 37 DEGREES
T AXIS: 74 DEGREES
T OFFSET: 401 MS
VENTRICULAR RATE: 91 BPM

## 2024-06-27 ENCOUNTER — APPOINTMENT (OUTPATIENT)
Dept: PRIMARY CARE | Facility: CLINIC | Age: 79
End: 2024-06-27
Payer: MEDICARE

## 2024-06-27 VITALS
TEMPERATURE: 98 F | HEIGHT: 63 IN | BODY MASS INDEX: 41.46 KG/M2 | WEIGHT: 234 LBS | SYSTOLIC BLOOD PRESSURE: 140 MMHG | HEART RATE: 82 BPM | OXYGEN SATURATION: 93 % | DIASTOLIC BLOOD PRESSURE: 82 MMHG

## 2024-06-27 DIAGNOSIS — E11.9 TYPE 2 DIABETES MELLITUS WITHOUT COMPLICATION, WITH LONG-TERM CURRENT USE OF INSULIN (MULTI): Primary | ICD-10-CM

## 2024-06-27 DIAGNOSIS — Z79.4 TYPE 2 DIABETES MELLITUS WITHOUT COMPLICATION, WITH LONG-TERM CURRENT USE OF INSULIN (MULTI): Primary | ICD-10-CM

## 2024-06-27 DIAGNOSIS — R40.4 TRANSIENT ALTERATION OF AWARENESS: Primary | ICD-10-CM

## 2024-06-27 DIAGNOSIS — E78.2 MIXED HYPERLIPIDEMIA: ICD-10-CM

## 2024-06-27 PROCEDURE — 1126F AMNT PAIN NOTED NONE PRSNT: CPT | Performed by: NURSE PRACTITIONER

## 2024-06-27 PROCEDURE — 3077F SYST BP >= 140 MM HG: CPT | Performed by: NURSE PRACTITIONER

## 2024-06-27 PROCEDURE — 1159F MED LIST DOCD IN RCRD: CPT | Performed by: NURSE PRACTITIONER

## 2024-06-27 PROCEDURE — 99214 OFFICE O/P EST MOD 30 MIN: CPT | Performed by: NURSE PRACTITIONER

## 2024-06-27 PROCEDURE — 1123F ACP DISCUSS/DSCN MKR DOCD: CPT | Performed by: NURSE PRACTITIONER

## 2024-06-27 PROCEDURE — 3079F DIAST BP 80-89 MM HG: CPT | Performed by: NURSE PRACTITIONER

## 2024-06-27 RX ORDER — FLASH GLUCOSE SENSOR
KIT MISCELLANEOUS
Qty: 6 EACH | Refills: 3 | Status: SHIPPED | OUTPATIENT
Start: 2024-06-27

## 2024-06-27 RX ORDER — TIMOLOL MALEATE 2.5 MG/ML
SOLUTION/ DROPS OPHTHALMIC
COMMUNITY
Start: 2024-05-04

## 2024-06-27 ASSESSMENT — PAIN SCALES - GENERAL: PAINLEVEL: 0-NO PAIN

## 2024-06-27 NOTE — PROGRESS NOTES
CGM Personal Start     Denia Tong presents to the office for her CGM personal start education and training. Referring Provider: Britany Dudley, APRN-CNP    Hemoglobin A1C (%)   Date Value   02/15/2024 9.9 (H)   05/26/2023 8.6 (H)   08/05/2020 10.5 (H)   07/15/2019 12.3 (H)     Glucose   Date Value   06/09/2024 69 mg/dL   02/15/2024 102 mg/dL (H)   05/26/2023 255 MG/DL (H)   08/05/2020 154 MG/DL (H)   07/15/2019 343 MG/DL (H)     Creatinine   Date Value   06/09/2024 0.80 mg/dL   02/15/2024 0.90 mg/dL   05/26/2023 0.8 MG/DL   08/05/2020 0.8 MG/DL   07/15/2019 0.7 MG/DL     eGFR (mL/min/1.73m*2)   Date Value   06/09/2024 75   02/15/2024 65     ESTIMATED GFR (mL/min/1.73 m2)   Date Value   05/26/2023 75   08/05/2020 74   07/15/2019 87       CURRENT PHARMACOTHERAPY  Medication Reconciliation completed with patient in office today   Current Outpatient Medications on File Prior to Visit   Medication Sig Dispense Refill    alendronate (Fosamax) 70 mg tablet Take 1 tablet (70 mg) by mouth every 7 days. Take in the morning with a full glass of water, on an empty stomach, and do not take anything else by mouth or lie down for the next 30 min. 4 tablet 11    glimepiride (Amaryl) 4 mg tablet Take 1 tablet by mouth twice daily 180 tablet 0    losartan (Cozaar) 50 mg tablet Take 1 tablet by mouth once daily 90 tablet 0    pravastatin (Pravachol) 40 mg tablet Take 1 tablet by mouth once daily 90 tablet 0    prednisoLONE acetate (Pred-Forte) 1 % ophthalmic suspension INSTILL 1 DROP INTO LEFT EYE SIX TIMES DAILY      sertraline (Zoloft) 100 mg tablet Take 2 tablets by mouth once daily 180 tablet 0    Toujeo SoloStar U-300 Insulin 300 unit/mL (1.5 mL) injection INJECT 50 UNITS SUBCUTANEOUSLY ONCE DAILY 6 mL 0    [DISCONTINUED] timolol (Timoptic) 0.5 % ophthalmic solution INSTILL 1 DROP INTO LEFT EYE IN THE MORNING      timolol (Timoptic) 0.25 % ophthalmic solution INSTILL 1 DROP INTO LEFT EYE ONCE DAILY      [DISCONTINUED]  albuterol 90 mcg/actuation inhaler Inhale.      [DISCONTINUED] fluticasone (Flovent HFA) 110 mcg/actuation inhaler Inhale.      [DISCONTINUED] FreeStyle Rebecca 2 [place] misc Freestyle Rebecca 2 reader Freestyle Rebecca 2 reader, See Instructions, Instructions: Check sugars at least one time every 8 hours as directed, Supply, # 1 EA, 0 Refill(s), Type: Maintenance, Pharmacy: [id number], Check sugars at least one time every 8 hours as directed, 62.5, in, 08/25/23 13:33:00 EDT, Height Measured, 229, lb, 08/25/23 13:33:00 EDT, Weight Measured Start Date: 8/25/23 Status: Ordered      [DISCONTINUED] insulin NPH, Isophane, (HumuLIN N,NovoLIN N) 100 unit/mL injection insulin NPH, Isophane, (NovoLIN N NPH U-100 Insulin) 100 unit/mL injection once every 24 hours. 0 Active      [DISCONTINUED] insulin regular (HumuLIN R,NovoLIN R) 100 unit/mL injection Inject under the skin.      [DISCONTINUED] insulin regular (HumuLIN R,NovoLIN R) 100 unit/mL injection insulin regular 100 units/mL human recombinant injectable solution See Instructions, Instructions: sliding scale before meals: 400 - 20 units and call office, # 15 mL, 3 Refill(s), Type: Maintenance, Ph... Start Date: 9/1/20 Status: Ordered       No current facility-administered medications on file prior to visit.       Allergies   Allergen Reactions    Penicillin Anaphylaxis and Unknown    Clindamycin Hives    Ethyl Alcohol Unknown    Phenol Hives and Unknown         SMBG:  Testing at home? No    CGM:  Device started: REBECCA 2  and WITH READER    Hypoglycemia tx/sx/prevention:   denies issues with low glucose  Reviewed sx and treatment  Advised to carry glucose source at all times    Assessment/Plan:  Patient received the following instructions for Rebecca personal start:   Sensor application and start up of sensor; aware to change in 14 days  Products for better adhesion; skin tac and simpatch  Expected differences in sensor glucose and blood glucose; always check with  meter if symptoms do not match sensor glucose or if magnify glass appears on display.   Explanation/importance of trend arrows.   [place] functions: target ranges set to ; alerts set if applicable.   Assisted with creation of [employer] account and connecting to practice.   If applicable, bring reader to all office visits  Remove for MRI, CAT scan and X-ray.   Call Mar at for problems with sensor, they will replace.   Patient correctly applied sensor to back of upper arm and sensor session started.  Handouts: Rebecca 3 Getting Started booklet, sample overbandage, sample Skin Tac     Plan:  Check jim often - when you wake up, before/after meals, bedtime, etc.  Follow jim closely, learning from glucose readings which meals/snacks cause higher blood sugars.  Call [employer] at [phone] for any problems with sensor readings or adhesion.  Follow up with Clinical pharmacist in 2 weeks.        [employer] 3 personal start education and training provided by [employer] [facility], [facility]

## 2024-06-27 NOTE — PROGRESS NOTES
"Subjective   Patient ID: Denia Tong is a 78 y.o. female who presents for Follow-up ([facility] ER/6/9/Pneumonia ).    HPI   Pt presents for ED follow up - was seen 6/10/24 for generalized weakness after waking up from a nap. States that it was all over generalized weakness and an inability to speak, lasted a few minutes. She had bilateral arm numbness. Work up revealed CBC does show leukocytosis with WBCs at 11.9 but is without signs of anemia. CMP without significant electrolyte abnormality or renal impairment. Initial troponin is elevated 18. Repeat troponin stable at 18. UA with micro shows possible signs of infection. Chest x-ray reads without acute cardiopulmonary process, attending physician the seems that there may be some signs of left lower lobe pneumonia. Oral doxycycline given. CT head is without acute findings.   Today, pt is feeling better. Is eating and drinking fluids. Working on better DM control.   She is taking medications as prescribe with the exception of pravastatin - needs refill    Review of Systems   Constitutional:  Negative for appetite change, chills, fatigue and fever.   HENT: Negative.     Eyes: Negative.    Respiratory:  Negative for cough, shortness of breath and wheezing.    Cardiovascular:  Negative for chest pain, palpitations and leg swelling.   Gastrointestinal: Negative.    Genitourinary: Negative.    Skin: Negative.    Neurological:  Negative for dizziness, tremors, syncope, facial asymmetry, speech difficulty, weakness, light-headedness and headaches.   Hematological: Negative.        Objective   /82 (BP Location: Left arm, Patient Position: Sitting)   Pulse 82   Temp 36.7 °C (98 °F) (Temporal)   Ht 1.588 m (5' 2.5\")   Wt 106 kg (234 lb)   LMP 01/01/1990 (Approximate)   SpO2 93%   BMI 42.12 kg/m²     Physical Exam  alert and oriented x3, NAD  Neck supple with no JVD  Lungs CTA bilaterally, no wheeze/rales/rhonchi. Speaks complete sentences  Heart with RRR with no " edema.  Abd obese, soft NT/ND  skin warm and dry  CN 2-12 intact. [place]    Assessment/Plan   Diagnoses and all orders for this visit:  Transient alteration of awareness  Pt with transient altered mental state. Was dx with pneumonia in ED although cxr read as clear. Suspect possible TIA. Recommend taking baby aspirin daily. Work on better chronic disease management.   Healthy diet, walking, safety  Follow up 1 month for bp check and reevaluation  Mixed hyperlipidemia  -     pravastatin (Pravachol) 40 mg tablet; Take 1 tablet (40 mg) by mouth once daily.  By hx  Needs refill

## 2024-07-05 DIAGNOSIS — Z79.4 TYPE 2 DIABETES MELLITUS WITH OTHER SPECIFIED COMPLICATION, WITH LONG-TERM CURRENT USE OF INSULIN (MULTI): ICD-10-CM

## 2024-07-05 DIAGNOSIS — E11.69 TYPE 2 DIABETES MELLITUS WITH OTHER SPECIFIED COMPLICATION, WITH LONG-TERM CURRENT USE OF INSULIN (MULTI): ICD-10-CM

## 2024-07-05 RX ORDER — INSULIN GLARGINE 300 U/ML
50 INJECTION, SOLUTION SUBCUTANEOUS DAILY
Qty: 6 ML | Refills: 0 | Status: SHIPPED | OUTPATIENT
Start: 2024-07-05

## 2024-07-07 RX ORDER — PRAVASTATIN SODIUM 40 MG/1
40 TABLET ORAL DAILY
Qty: 90 TABLET | Refills: 0 | Status: SHIPPED | OUTPATIENT
Start: 2024-07-07

## 2024-07-09 ASSESSMENT — ENCOUNTER SYMPTOMS
GASTROINTESTINAL NEGATIVE: 1
APPETITE CHANGE: 0
TREMORS: 0
LIGHT-HEADEDNESS: 0
SPEECH DIFFICULTY: 0
WHEEZING: 0
HEMATOLOGIC/LYMPHATIC NEGATIVE: 1
SHORTNESS OF BREATH: 0
FEVER: 0
PALPITATIONS: 0
CHILLS: 0
COUGH: 0
WEAKNESS: 0
FATIGUE: 0
DIZZINESS: 0
FACIAL ASYMMETRY: 0
HEADACHES: 0
EYES NEGATIVE: 1

## 2024-07-10 ENCOUNTER — APPOINTMENT (OUTPATIENT)
Dept: PRIMARY CARE | Facility: CLINIC | Age: 79
End: 2024-07-10
Payer: MEDICARE

## 2024-07-11 ENCOUNTER — CLINICAL SUPPORT (OUTPATIENT)
Dept: PRIMARY CARE | Facility: CLINIC | Age: 79
End: 2024-07-11
Payer: MEDICARE

## 2024-07-11 DIAGNOSIS — E11.9 TYPE 2 DIABETES MELLITUS WITHOUT COMPLICATION, WITHOUT LONG-TERM CURRENT USE OF INSULIN (MULTI): Primary | ICD-10-CM

## 2024-07-11 RX ORDER — TIRZEPATIDE 2.5 MG/.5ML
2.5 INJECTION, SOLUTION SUBCUTANEOUS
Qty: 2 ML | Refills: 0 | Status: SHIPPED | OUTPATIENT
Start: 2024-07-11 | End: 2024-08-10

## 2024-07-11 NOTE — PATIENT INSTRUCTIONS
START Mounjaro 2.5mg once weekly   STOP glimepiride   Decrease Toujeo to 46 units every night   Follow up with Lilliana in one month

## 2024-07-11 NOTE — PROGRESS NOTES
DM FOLLOW UP  E11.9    Denia Tong presents to the office for her CGM review. She is using Freestyle Rebecca 3. She obtains her supplies from retail pharmacy .      Referring Provider: ADAM Hunter    Reviewed all medications by prescribing practitioner (such as prescriptions, OTCs, herbal therapies and supplements) and documented in the medical record.   Pt denies SE/intolerances.       CURRENT DM PHARMACOTHERAPY   Mounjaro 2.5mg weekly   Toujeo 50 units daily     Summary of CGM Findings:  CGM wear time 14 days   % Active 67%   Avg Glucose 175   GMI 7.5%   Variability 40.7%   TAR 57%   TIR 42%   TBR 1%     CGM Reports reviewed with patient  *See attached documents*  Summary of CGM Findings:  Avg glucose = 175mg/dl   Total frequency of hypoglycemia: 1%, nocturnal  Postprandial hyperglycemia noted on daily CGM report.      Hemoglobin A1C (%)   Date Value   02/15/2024 9.9 (H)   05/26/2023 8.6 (H)   08/05/2020 10.5 (H)   07/15/2019 12.3 (H)     Glucose (mg/dL)   Date Value   06/09/2024 69     Creatinine (mg/dL)   Date Value   06/09/2024 0.80     eGFR (mL/min/1.73m*2)   Date Value   06/09/2024 75       Current Outpatient Medications on File Prior to Visit   Medication Sig Dispense Refill    alendronate (Fosamax) 70 mg tablet Take 1 tablet (70 mg) by mouth every 7 days. Take in the morning with a full glass of water, on an empty stomach, and do not take anything else by mouth or lie down for the next 30 min. 4 tablet 11    flash glucose sensor kit (FreeStyle Rebecca 2 Sensor) kit Change sensor every 14 days as directed 6 each 3    glimepiride (Amaryl) 4 mg tablet Take 1 tablet by mouth twice daily 180 tablet 0    losartan (Cozaar) 50 mg tablet Take 1 tablet by mouth once daily 90 tablet 0    pravastatin (Pravachol) 40 mg tablet Take 1 tablet (40 mg) by mouth once daily. 90 tablet 0    prednisoLONE acetate (Pred-Forte) 1 % ophthalmic suspension INSTILL 1 DROP INTO LEFT EYE SIX TIMES DAILY      sertraline  (Zoloft) 100 mg tablet Take 2 tablets by mouth once daily 180 tablet 0    timolol (Timoptic) 0.25 % ophthalmic solution INSTILL 1 DROP INTO LEFT EYE ONCE DAILY      Toujeo SoloStar U-300 Insulin 300 unit/mL (1.5 mL) injection INJECT 50 UNITS SUBCUTANEOUSLY ONCE DAILY 6 mL 0    [DISCONTINUED] Toujeo SoloStar U-300 Insulin 300 unit/mL (1.5 mL) injection INJECT 50 UNITS SUBCUTANEOUSLY ONCE DAILY 6 mL 0     No current facility-administered medications on file prior to visit.       Assessment/Plan:   Will benefit from inc dose GLP-1  Nocturnal hypoglycemia noted, suggest decrease in basal insulin dose  Suggest discontinue MEADOWS      Plan:   START Mounjaro 2.5mg once weekly   STOP glimepiride   Decrease Toujeo to 46 units every night   Follow up with Lilliana in one month       Data reviewed and evaluated by LEWIS Garcia, RP, CDCES  Treatment and plan changes discussed with Britany Dudley, APRN-CNP

## 2024-07-12 DIAGNOSIS — I10 PRIMARY HYPERTENSION: ICD-10-CM

## 2024-07-12 RX ORDER — LOSARTAN POTASSIUM 50 MG/1
50 TABLET ORAL DAILY
Qty: 90 TABLET | Refills: 0 | Status: SHIPPED | OUTPATIENT
Start: 2024-07-12

## 2024-08-08 ENCOUNTER — APPOINTMENT (OUTPATIENT)
Dept: PRIMARY CARE | Facility: CLINIC | Age: 79
End: 2024-08-08
Payer: MEDICARE

## 2024-08-08 DIAGNOSIS — E11.40 TYPE 2 DIABETES MELLITUS WITH DIABETIC NEUROPATHY, WITH LONG-TERM CURRENT USE OF INSULIN (MULTI): Primary | ICD-10-CM

## 2024-08-08 DIAGNOSIS — Z79.4 TYPE 2 DIABETES MELLITUS WITH DIABETIC NEUROPATHY, WITH LONG-TERM CURRENT USE OF INSULIN (MULTI): Primary | ICD-10-CM

## 2024-08-09 ENCOUNTER — TELEMEDICINE (OUTPATIENT)
Dept: PHARMACY | Facility: HOSPITAL | Age: 79
End: 2024-08-09
Payer: MEDICARE

## 2024-08-09 DIAGNOSIS — E11.9 TYPE 2 DIABETES MELLITUS WITHOUT COMPLICATION, WITHOUT LONG-TERM CURRENT USE OF INSULIN (MULTI): ICD-10-CM

## 2024-08-09 DIAGNOSIS — Z79.4 TYPE 2 DIABETES MELLITUS WITH OTHER SPECIFIED COMPLICATION, WITH LONG-TERM CURRENT USE OF INSULIN (MULTI): ICD-10-CM

## 2024-08-09 DIAGNOSIS — Z79.4 TYPE 2 DIABETES MELLITUS WITH DIABETIC NEUROPATHY, WITH LONG-TERM CURRENT USE OF INSULIN (MULTI): ICD-10-CM

## 2024-08-09 DIAGNOSIS — E11.40 TYPE 2 DIABETES MELLITUS WITH DIABETIC NEUROPATHY, WITH LONG-TERM CURRENT USE OF INSULIN (MULTI): ICD-10-CM

## 2024-08-09 DIAGNOSIS — E11.69 TYPE 2 DIABETES MELLITUS WITH OTHER SPECIFIED COMPLICATION, WITH LONG-TERM CURRENT USE OF INSULIN (MULTI): ICD-10-CM

## 2024-08-09 RX ORDER — TIRZEPATIDE 2.5 MG/.5ML
2.5 INJECTION, SOLUTION SUBCUTANEOUS
Qty: 2 ML | Refills: 0 | Status: SHIPPED | OUTPATIENT
Start: 2024-08-09 | End: 2024-09-08

## 2024-08-09 RX ORDER — INSULIN GLARGINE 300 [IU]/ML
50 INJECTION, SOLUTION SUBCUTANEOUS DAILY
Qty: 15 ML | Refills: 3 | Status: SHIPPED | OUTPATIENT
Start: 2024-08-09

## 2024-08-09 NOTE — PROGRESS NOTES
MEDICATION MANAGEMENT    Denia Tong is a 78 y.o. female who was referred by ADAM Hunter to complete medication reconciliation and review with the clinical pharmacist.  A comprehensive medication review was completed with the patient via telephone today.  With patient's permission, this is a Telemedicine visit with audio. Provider located at office address. Patient located at their home address. All issues as documented below were discussed and addressed but limited physical exam was performed. If it was felt that the patient should be evaluated via face-to-face office appointment(s) they were directed to appropriate location.  Patient reports financial barrier with current medication.      MEDICATION HISTORY  Allergies   Allergen Reactions    Penicillin Anaphylaxis and Unknown    Clindamycin Hives    Ethyl Alcohol Unknown    Phenol Hives and Unknown     Current Outpatient Medications on File Prior to Visit   Medication Sig Dispense Refill    alendronate (Fosamax) 70 mg tablet Take 1 tablet (70 mg) by mouth every 7 days. Take in the morning with a full glass of water, on an empty stomach, and do not take anything else by mouth or lie down for the next 30 min. 4 tablet 11    flash glucose sensor kit (FreeStyle Rebecca 2 Sensor) kit Change sensor every 14 days as directed 6 each 3    losartan (Cozaar) 50 mg tablet Take 1 tablet (50 mg) by mouth once daily. 90 tablet 0    pravastatin (Pravachol) 40 mg tablet Take 1 tablet (40 mg) by mouth once daily. 90 tablet 0    prednisoLONE acetate (Pred-Forte) 1 % ophthalmic suspension INSTILL 1 DROP INTO LEFT EYE SIX TIMES DAILY      sertraline (Zoloft) 100 mg tablet Take 2 tablets by mouth once daily 180 tablet 0    timolol (Timoptic) 0.25 % ophthalmic solution INSTILL 1 DROP INTO LEFT EYE ONCE DAILY      tirzepatide (Mounjaro) 2.5 mg/0.5 mL pen injector Inject 2.5 mg under the skin every 7 days. 2 mL 0    Toujeo SoloStar U-300 Insulin 300 unit/mL (1.5 mL)  injection INJECT 50 UNITS SUBCUTANEOUSLY ONCE DAILY 6 mL 0    [DISCONTINUED] Linda RubinoStar U-300 Insulin 300 unit/mL (1.5 mL) injection INJECT 50 UNITS SUBCUTANEOUSLY ONCE DAILY 6 mL 0     No current facility-administered medications on file prior to visit.        Patient Assistance Screening (VAF)  Patient verbally reports monthly or yearly income which is less than 400% federal poverty level.  Application for program has been submitted for the following medications:     Linda Oden     Patient has been informed that program team will be reaching out to them to discuss necessary documentation, instructed to answer phone/return voicemail.   Patient aware this process may take up to 6 weeks.   If approved medication must be filled through [facility] pharmacy and may be picked up or mailed to patient.       LABS  LMP 01/01/1990 (Approximate)    Lab Results   Component Value Date    HGBA1C 9.9 (H) 02/15/2024    HGBA1C 8.6 (H) 05/26/2023    HGBA1C 10.5 (H) 08/05/2020     Lab Results   Component Value Date    BILITOT 0.4 06/09/2024    CALCIUM 9.2 06/09/2024    CO2 24 06/09/2024     06/09/2024    CREATININE 0.80 06/09/2024    GLUCOSE 69 06/09/2024    ALKPHOS 84 06/09/2024    K 4.3 06/09/2024    PROT 7.6 06/09/2024     06/09/2024    AST 18 06/09/2024    ALT 11 06/09/2024    BUN 17 06/09/2024    ANIONGAP 13 06/09/2024    ALBUMIN 3.9 06/09/2024     Lab Results   Component Value Date    TRIG 121 02/15/2024    CHOL 210 (H) 02/15/2024    LDLCALC 143 (H) 02/15/2024    HDL 43.0 (L) 02/15/2024         Assessment/Plan    Comments/Recommendations to PCP:  Reconciled medications with patient via telephone today.  Reviewed instructions, MOA, SE and dose for Ben Odenjeo    Patient verbalizes understanding regarding plan of care and all questions answered   4.   Pt will follow up with PCP as scheduled          Lilliana Garcia RP PUSHPA    Verbal consent to manage patient's drug therapy was obtained from the  patient and/or an individual authorized to act on behalf of a patient. They were informed they may decline to participate or withdraw from participation in pharmacy services at any time.

## 2024-08-09 NOTE — PROGRESS NOTES
Please review for internal [employer] Assistance [employer] (VAF) eligibility. Prescriptions have been sent to [facility].     Denia FRANCES Alycia  1945   [id number]  [phone]   [email or web address]  Delivery Preference (if known): MAIL  Priority:  Hold Medication until [facility] approved/denied  Filing Status: Patient does not file taxes  Household size: 1  Financial Documents To Be Collected By: Documents attached to email  Medication(s):    Linda Oden     *I have confirmed the above medications are listed on the current VAF formulary: [email or web address]    I acknowledge the [facility] staff will further reach out to the patient to confirm additional details as needed, including but not limited to collecting financial documentation, confirming delivery information, obtaining payment method for non-VAF medications.      Lilliana Garcia [facility]

## 2024-08-19 ENCOUNTER — APPOINTMENT (OUTPATIENT)
Dept: PRIMARY CARE | Facility: CLINIC | Age: 79
End: 2024-08-19
Payer: MEDICARE

## 2024-09-04 ENCOUNTER — TELEPHONE (OUTPATIENT)
Dept: PRIMARY CARE | Facility: CLINIC | Age: 79
End: 2024-09-04
Payer: MEDICARE

## 2024-09-04 DIAGNOSIS — I10 PRIMARY HYPERTENSION: ICD-10-CM

## 2024-09-04 DIAGNOSIS — E11.9 TYPE 2 DIABETES MELLITUS WITHOUT COMPLICATION, WITHOUT LONG-TERM CURRENT USE OF INSULIN (MULTI): ICD-10-CM

## 2024-09-04 DIAGNOSIS — E78.2 MIXED HYPERLIPIDEMIA: ICD-10-CM

## 2024-09-11 ENCOUNTER — APPOINTMENT (OUTPATIENT)
Dept: PRIMARY CARE | Facility: CLINIC | Age: 79
End: 2024-09-11
Payer: MEDICARE

## 2024-09-17 ENCOUNTER — LAB (OUTPATIENT)
Dept: LAB | Facility: LAB | Age: 79
End: 2024-09-17
Payer: MEDICARE

## 2024-09-17 DIAGNOSIS — E78.2 MIXED HYPERLIPIDEMIA: ICD-10-CM

## 2024-09-17 DIAGNOSIS — E11.9 TYPE 2 DIABETES MELLITUS WITHOUT COMPLICATION, WITHOUT LONG-TERM CURRENT USE OF INSULIN (MULTI): ICD-10-CM

## 2024-09-17 LAB
ALBUMIN SERPL BCP-MCNC: 4 G/DL (ref 3.4–5)
ALP SERPL-CCNC: 58 U/L (ref 33–136)
ALT SERPL W P-5'-P-CCNC: 8 U/L (ref 7–45)
ANION GAP SERPL CALCULATED.3IONS-SCNC: 13 MMOL/L (ref 10–20)
APPEARANCE UR: CLEAR
AST SERPL W P-5'-P-CCNC: 14 U/L (ref 9–39)
BILIRUB SERPL-MCNC: 0.6 MG/DL (ref 0–1.2)
BILIRUB UR STRIP.AUTO-MCNC: NEGATIVE MG/DL
BUN SERPL-MCNC: 19 MG/DL (ref 6–23)
CALCIUM SERPL-MCNC: 9 MG/DL (ref 8.6–10.3)
CHLORIDE SERPL-SCNC: 106 MMOL/L (ref 98–107)
CHOLEST SERPL-MCNC: 191 MG/DL (ref 0–199)
CHOLEST/HDLC SERPL: 4.5 {RATIO}
CO2 SERPL-SCNC: 27 MMOL/L (ref 21–32)
COLOR UR: NORMAL
CREAT SERPL-MCNC: 0.89 MG/DL (ref 0.5–1.05)
CREAT UR-MCNC: 123.8 MG/DL (ref 20–320)
EGFRCR SERPLBLD CKD-EPI 2021: 66 ML/MIN/1.73M*2
GLUCOSE SERPL-MCNC: 65 MG/DL (ref 74–99)
GLUCOSE UR STRIP.AUTO-MCNC: NORMAL MG/DL
HDLC SERPL-MCNC: 42.1 MG/DL
KETONES UR STRIP.AUTO-MCNC: NEGATIVE MG/DL
LDLC SERPL CALC-MCNC: 124 MG/DL
LEUKOCYTE ESTERASE UR QL STRIP.AUTO: NEGATIVE
MICROALBUMIN UR-MCNC: 64.2 MG/L
MICROALBUMIN/CREAT UR: 51.9 UG/MG CREAT
NITRITE UR QL STRIP.AUTO: NEGATIVE
NON HDL CHOLESTEROL: 149 MG/DL (ref 0–149)
PH UR STRIP.AUTO: 6 [PH]
POTASSIUM SERPL-SCNC: 4.5 MMOL/L (ref 3.5–5.3)
PROT SERPL-MCNC: 7.1 G/DL (ref 6.4–8.2)
PROT UR STRIP.AUTO-MCNC: NEGATIVE MG/DL
RBC # UR STRIP.AUTO: NEGATIVE /UL
SODIUM SERPL-SCNC: 141 MMOL/L (ref 136–145)
SP GR UR STRIP.AUTO: 1.02
TRIGL SERPL-MCNC: 127 MG/DL (ref 0–149)
UROBILINOGEN UR STRIP.AUTO-MCNC: NORMAL MG/DL
VLDL: 25 MG/DL (ref 0–40)

## 2024-09-17 PROCEDURE — 82570 ASSAY OF URINE CREATININE: CPT

## 2024-09-17 PROCEDURE — 81003 URINALYSIS AUTO W/O SCOPE: CPT

## 2024-09-17 PROCEDURE — 83036 HEMOGLOBIN GLYCOSYLATED A1C: CPT

## 2024-09-17 PROCEDURE — 80061 LIPID PANEL: CPT

## 2024-09-17 PROCEDURE — 82043 UR ALBUMIN QUANTITATIVE: CPT

## 2024-09-17 PROCEDURE — 36415 COLL VENOUS BLD VENIPUNCTURE: CPT

## 2024-09-17 PROCEDURE — 80053 COMPREHEN METABOLIC PANEL: CPT

## 2024-09-18 LAB
EST. AVERAGE GLUCOSE BLD GHB EST-MCNC: 163 MG/DL
HBA1C MFR BLD: 7.3 %

## 2024-09-20 ENCOUNTER — APPOINTMENT (OUTPATIENT)
Dept: PRIMARY CARE | Facility: CLINIC | Age: 79
End: 2024-09-20
Payer: MEDICARE

## 2024-09-20 ENCOUNTER — CLINICAL SUPPORT (OUTPATIENT)
Dept: PRIMARY CARE | Facility: CLINIC | Age: 79
End: 2024-09-20
Payer: MEDICARE

## 2024-09-20 VITALS
OXYGEN SATURATION: 96 % | DIASTOLIC BLOOD PRESSURE: 92 MMHG | HEART RATE: 92 BPM | WEIGHT: 232 LBS | TEMPERATURE: 97.4 F | BODY MASS INDEX: 41.76 KG/M2 | SYSTOLIC BLOOD PRESSURE: 148 MMHG

## 2024-09-20 DIAGNOSIS — Z79.4 TYPE 2 DIABETES MELLITUS WITHOUT COMPLICATION, WITH LONG-TERM CURRENT USE OF INSULIN (MULTI): ICD-10-CM

## 2024-09-20 DIAGNOSIS — E11.9 TYPE 2 DIABETES MELLITUS WITHOUT COMPLICATION, WITHOUT LONG-TERM CURRENT USE OF INSULIN (MULTI): Primary | ICD-10-CM

## 2024-09-20 DIAGNOSIS — Z79.4 TYPE 2 DIABETES MELLITUS WITH OTHER SPECIFIED COMPLICATION, WITH LONG-TERM CURRENT USE OF INSULIN: ICD-10-CM

## 2024-09-20 DIAGNOSIS — E11.69 TYPE 2 DIABETES MELLITUS WITH OTHER SPECIFIED COMPLICATION, WITH LONG-TERM CURRENT USE OF INSULIN: ICD-10-CM

## 2024-09-20 DIAGNOSIS — I10 PRIMARY HYPERTENSION: Primary | ICD-10-CM

## 2024-09-20 DIAGNOSIS — E11.9 TYPE 2 DIABETES MELLITUS WITHOUT COMPLICATION, WITH LONG-TERM CURRENT USE OF INSULIN (MULTI): ICD-10-CM

## 2024-09-20 DIAGNOSIS — E78.2 MIXED HYPERLIPIDEMIA: ICD-10-CM

## 2024-09-20 PROCEDURE — 99214 OFFICE O/P EST MOD 30 MIN: CPT | Performed by: NURSE PRACTITIONER

## 2024-09-20 PROCEDURE — 1159F MED LIST DOCD IN RCRD: CPT | Performed by: NURSE PRACTITIONER

## 2024-09-20 PROCEDURE — 3080F DIAST BP >= 90 MM HG: CPT | Performed by: NURSE PRACTITIONER

## 2024-09-20 PROCEDURE — 1123F ACP DISCUSS/DSCN MKR DOCD: CPT | Performed by: NURSE PRACTITIONER

## 2024-09-20 PROCEDURE — 1126F AMNT PAIN NOTED NONE PRSNT: CPT | Performed by: NURSE PRACTITIONER

## 2024-09-20 PROCEDURE — 3077F SYST BP >= 140 MM HG: CPT | Performed by: NURSE PRACTITIONER

## 2024-09-20 PROCEDURE — G0008 ADMIN INFLUENZA VIRUS VAC: HCPCS | Performed by: NURSE PRACTITIONER

## 2024-09-20 PROCEDURE — 90662 IIV NO PRSV INCREASED AG IM: CPT | Performed by: NURSE PRACTITIONER

## 2024-09-20 PROCEDURE — RXMED WILLOW AMBULATORY MEDICATION CHARGE

## 2024-09-20 RX ORDER — TIRZEPATIDE 2.5 MG/.5ML
2.5 INJECTION, SOLUTION SUBCUTANEOUS
Qty: 2 ML | Refills: 0 | Status: SHIPPED | OUTPATIENT
Start: 2024-09-20 | End: 2024-10-22

## 2024-09-20 RX ORDER — INSULIN GLARGINE 300 [IU]/ML
50 INJECTION, SOLUTION SUBCUTANEOUS DAILY
Qty: 6 ML | Refills: 0 | Status: SHIPPED | OUTPATIENT
Start: 2024-09-20

## 2024-09-20 ASSESSMENT — PAIN SCALES - GENERAL: PAINLEVEL: 0-NO PAIN

## 2024-09-20 NOTE — PROGRESS NOTES
Subjective   Patient ID: Denia Tong is a 79 y.o. female who presents for Hypertension, Hyperlipidemia, and Diabetes.    HPI   Denia is an 78 yo F who presents for interval visit  See problem list for hx    Diet is varied  Pt does not exercise  Has increased stressors    Review of Systems  Constitutional Symptoms: Negative for fever, loss of appetite, headaches, fatigue.   Cardiovascular: Negative for chest pain/pressure, palpitations, edema  Respiratory: Negative for shortness of breath, dyspnea on exertion, pain with breathing, coughing.   Gastrointestinal: Negative for nausea, vomiting, abdominal pain, change in bowel habits  Musculoskeletal: Negative for joint pain, joint swelling, myalgias, cramps.   Integumentary: Negative for skin trouble or rash.   Neurological: Negative for headache, numbness, tingling, weakness, tremors.   Psychiatric: Negative for depression, anxiety.   Endocrine: Negative for weight gain, heat or cold intolerance, polyuria, polydipsia, polyphagia.   Hematologic/Lymphatic: Negative for bruising, abnormal bleeding, swollen glands.       Objective   BP (!) 148/92 (BP Location: Left arm, Patient Position: Sitting)   Pulse 92   Temp 36.3 °C (97.4 °F) (Temporal)   Wt 105 kg (232 lb)   LMP 01/01/1990 (Approximate)   SpO2 96%   BMI 41.76 kg/m²     Physical Exam  alert and oriented x3, NAD  Neck supple with no JVD  Lungs CTA bilaterally  Heart with RRR with no edema.  Abd obese, + normoactive bowel sounds, abd soft NT/ND  skin warm and dry  Neuro grossly intact     Assessment/Plan   Diagnoses and all orders for this visit:  Primary hypertension  Bp elevated today  On losaratan 50mg  Take meds consistently  Mediterranean diet, exercise, SIOBHAN diet  Follow up 1 month with Clinical pharmacist and recheck bp at that time  Mixed hyperlipidemia  Chol 191 HD 42.1  Trigs 127  On pravastatin 40mg daily  Mediterranean diet, exercise  Follow up 3 months  Type 2 diabetes mellitus without  complication, with long-term current use of insulin (Multi)  Glucose 65 and A1c 7.3  Meeting with clinical pharmacist today after this OV  Pt did not start moujaro as prescribed  Will start and continue toujeo  Continue use of CGM and see clinical pharmacist in 1 month    Other orders  -     Flu vaccine, trivalent, preservative free, HIGH-DOSE, age 65y+ (Fluzone)  Indications, benefits and risks/SE discussed

## 2024-09-20 NOTE — PROGRESS NOTES
DM FOLLOW UP  E11.9    Denia Tong presents to the office for her DM review. Referring Provider: JANKI Hunter-CNP     CURRENT DM PHARMACOTHERAPY   Toujeo 50 units daily - pt reports that she self decreased insulin to 46 units daily due to low sugars  Mounjaro 2.5mg - was to start medication but patient reports she did not receive from [facility]  Pt denies SE/intolerances.  Reviewed all medications by prescribing practitioner (such as prescriptions, OTCs, herbal therapies and supplements) and documented in the medical record.         Primary/Secondary Prevention   - Statin? Yes  - ACE-I/ARB? Yes  - Aspirin? No    Summary of CGM Findings:  Patient is using: continuous glucose monitor, Freestyle Rebceca 2     Name: Denia Tong  YOB: 1945  Report Period: 09/05/2024 - 09/18/2024 (14 days)  Generated: 09/20/2024  % Time CGM Active: 76%    Glucose Statistics and Targets  Average Glucose: 128 mg/dL  Glucose Management Indicator (GMI): 6.4%  Glucose Variability (%CV): 44.3%  Target Range: 70 - 180 mg/dL    Time in Ranges  Very High: >250 mg/dL --- 3%  High: 181 - 250 mg/dL --- 18%  Target Range: 70 - 180 mg/dL --- 63%  Low: 54 - 69 mg/dL --- 15%  Very Low: <54 mg/dL --- 1%      CGM Reports reviewed with patient  *See attached documents*  Summary of CGM Findings:  Avg glucose = 128 mg/dl    Total frequency of hypoglycemia: 16%  Postprandial hyperglycemia noted on daily CGM report especially with dinner meal     Last Labs/Vitals/Meds  Hemoglobin A1C (%)   Date Value   09/17/2024 7.3 (H)   02/15/2024 9.9 (H)   05/26/2023 8.6 (H)   08/05/2020 10.5 (H)   07/15/2019 12.3 (H)     Glucose (mg/dL)   Date Value   09/17/2024 65 (L)     Creatinine (mg/dL)   Date Value   09/17/2024 0.89     eGFR (mL/min/1.73m*2)   Date Value   09/17/2024 66       BP Readings from Last 6 Encounters:   09/20/24 (!) 148/92   06/27/24 140/82   06/09/24 177/71   03/22/24 142/75   03/13/24 (!) 143/101   02/16/24 144/78        Wt  "Readings from Last 6 Encounters:   09/20/24 105 kg (232 lb)   06/27/24 106 kg (234 lb)   06/09/24 105 kg (232 lb 9.4 oz)   04/09/24 106 kg (233 lb 11 oz)   03/26/24 106 kg (233 lb 11 oz)   03/22/24 106 kg (233 lb 14.5 oz)       Current Outpatient Medications on File Prior to Visit   Medication Sig Dispense Refill    alendronate (Fosamax) 70 mg tablet Take 1 tablet (70 mg) by mouth every 7 days. Take in the morning with a full glass of water, on an empty stomach, and do not take anything else by mouth or lie down for the next 30 min. 4 tablet 11    flash glucose sensor kit (FreeStyle Rebecca 2 Sensor) kit Change sensor every 14 days as directed 6 each 3    losartan (Cozaar) 50 mg tablet Take 1 tablet (50 mg) by mouth once daily. 90 tablet 0    pravastatin (Pravachol) 40 mg tablet Take 1 tablet (40 mg) by mouth once daily. 90 tablet 0    prednisoLONE acetate (Pred-Forte) 1 % ophthalmic suspension INSTILL 1 DROP INTO LEFT EYE SIX TIMES DAILY      sertraline (Zoloft) 100 mg tablet Take 2 tablets by mouth once daily 180 tablet 0    timolol (Timoptic) 0.25 % ophthalmic solution INSTILL 1 DROP INTO LEFT EYE ONCE DAILY      [DISCONTINUED] Toujeo SoloStar U-300 Insulin 300 unit/mL (1.5 mL) injection INJECT 50 UNITS SUBCUTANEOUSLY ONCE DAILY 6 mL 0     No current facility-administered medications on file prior to visit.       Lifestyle:  Current diet: in general, an \"unhealthy\" diet, trying to eat better   Current exercise: no regular exercise    Assessment/Plan:   Pt did not start Mounjaro after last OV.  States she did not get a call from [facility] pharmacy regarding new prescription start.    Will need to decrease basal insulin due to nocturnal hypoglycemia.        Plan:  1. START Mounjaro 2.5mg once weekly   2.  DECREASE Toujeo to 42 units daily   3.  Follow up 3 weeks   Continue all meds under the continuation of care with the referring provider and clinical pharmacy team.    Data reviewed and evaluated by LEWIS Garcia [facility], " CDCES  Treatment and plan changes discussed with JANKI Hunter-CNP

## 2024-09-24 ENCOUNTER — PHARMACY VISIT (OUTPATIENT)
Dept: PHARMACY | Facility: CLINIC | Age: 79
End: 2024-09-24
Payer: MEDICARE

## 2024-09-27 ENCOUNTER — TELEPHONE (OUTPATIENT)
Dept: PRIMARY CARE | Facility: CLINIC | Age: 79
End: 2024-09-27
Payer: MEDICARE

## 2024-09-27 NOTE — TELEPHONE ENCOUNTER
Patient called [phone] wanted to let Lilliana know she on;ly received 1 box of Mounjaro and she did not receive  Trujeo

## 2024-10-11 ENCOUNTER — APPOINTMENT (OUTPATIENT)
Dept: PRIMARY CARE | Facility: CLINIC | Age: 79
End: 2024-10-11
Payer: MEDICARE

## 2025-01-27 DIAGNOSIS — F32.A DEPRESSION, UNSPECIFIED DEPRESSION TYPE: ICD-10-CM

## 2025-01-27 DIAGNOSIS — I10 PRIMARY HYPERTENSION: ICD-10-CM

## 2025-01-27 RX ORDER — SERTRALINE HYDROCHLORIDE 100 MG/1
200 TABLET, FILM COATED ORAL DAILY
Qty: 180 TABLET | Refills: 0 | Status: SHIPPED | OUTPATIENT
Start: 2025-01-27

## 2025-01-27 RX ORDER — LOSARTAN POTASSIUM 50 MG/1
50 TABLET ORAL DAILY
Qty: 90 TABLET | Refills: 0 | Status: SHIPPED | OUTPATIENT
Start: 2025-01-27

## 2025-01-28 ENCOUNTER — TELEPHONE (OUTPATIENT)
Dept: PRIMARY CARE | Facility: CLINIC | Age: 80
End: 2025-01-28
Payer: MEDICARE

## 2025-01-28 DIAGNOSIS — Z79.4 TYPE 2 DIABETES MELLITUS WITH OTHER SPECIFIED COMPLICATION, WITH LONG-TERM CURRENT USE OF INSULIN: ICD-10-CM

## 2025-01-28 DIAGNOSIS — E11.69 TYPE 2 DIABETES MELLITUS WITH OTHER SPECIFIED COMPLICATION, WITH LONG-TERM CURRENT USE OF INSULIN: ICD-10-CM

## 2025-01-28 DIAGNOSIS — E11.9 TYPE 2 DIABETES MELLITUS WITHOUT COMPLICATION, WITHOUT LONG-TERM CURRENT USE OF INSULIN (MULTI): ICD-10-CM

## 2025-01-28 DIAGNOSIS — Z00.00 WELL ADULT EXAM: ICD-10-CM

## 2025-01-28 DIAGNOSIS — E78.2 MIXED HYPERLIPIDEMIA: ICD-10-CM

## 2025-01-28 RX ORDER — INSULIN GLARGINE 300 [IU]/ML
50 INJECTION, SOLUTION SUBCUTANEOUS DAILY
Qty: 6 ML | Refills: 3 | Status: SHIPPED | OUTPATIENT
Start: 2025-01-28

## 2025-01-28 NOTE — TELEPHONE ENCOUNTER
Patient requested refill of toujeo insulin pens to go to [facility] in [place].  Upcoming appointment for Medicare annual on 2-27-25.

## 2025-02-03 ENCOUNTER — APPOINTMENT (OUTPATIENT)
Dept: PRIMARY CARE | Facility: CLINIC | Age: 80
End: 2025-02-03
Payer: MEDICARE

## 2025-02-03 VITALS — WEIGHT: 230 LBS | HEIGHT: 63 IN | BODY MASS INDEX: 40.75 KG/M2

## 2025-02-03 DIAGNOSIS — E11.40 TYPE 2 DIABETES MELLITUS WITH DIABETIC NEUROPATHY, WITH LONG-TERM CURRENT USE OF INSULIN: Primary | ICD-10-CM

## 2025-02-03 DIAGNOSIS — Z79.4 TYPE 2 DIABETES MELLITUS WITH DIABETIC NEUROPATHY, WITH LONG-TERM CURRENT USE OF INSULIN: Primary | ICD-10-CM

## 2025-02-03 NOTE — PATIENT INSTRUCTIONS
START Mounjaro 2.5mg once weekly   Toujeo 30 units daily   DO NOT use Novolin R (fast acting)  4.  Follow up on 2/27/25 with Lilliana

## 2025-02-03 NOTE — PROGRESS NOTES
DM FOLLOW UP  E11.9    Denia Tong is a 79 y.o. female here today at the request of Referring Provider: ADAM Cody for my opinion regarding diabetes management.  My final recommendations will be communicated back to the requesting provider by way of shared medical record.     Patient  does not need VAF, has medicare extra help    Subjective   Past Medical History:  She has a past medical history of Cataracts, bilateral, DM (diabetes mellitus) (Multi), High cholesterol, History of right shoulder fracture, Hypertension, Left knee pain, and Thoracic spine fracture (Multi).    Social History:  She reports that she quit smoking about 32 years ago. Her smoking use included cigarettes. She has never used smokeless tobacco. She reports that she does not drink alcohol and does not use drugs.    Allergies:  Penicillin, Clindamycin, Ethyl alcohol, and Phenol    CURRENT PHARMACOTHERAPY   Current Outpatient Medications   Medication Instructions    alendronate (FOSAMAX) 70 mg, oral, Every 7 days, Take in the morning with a full glass of water, on an empty stomach, and do not take anything else by mouth or lie down for the next 30 min.    flash glucose sensor kit (FreeStyle Rebecca 2 Sensor) kit Change sensor every 14 days as directed    insulin glargine (TOUJEO SOLOSTAR U-300 INSULIN) 50 Units, subcutaneous, Daily    losartan (COZAAR) 50 mg, oral, Daily    pravastatin (PRAVACHOL) 40 mg, oral, Daily    prednisoLONE acetate (Pred-Forte) 1 % ophthalmic suspension INSTILL 1 DROP INTO LEFT EYE SIX TIMES DAILY    sertraline (ZOLOFT) 200 mg, oral, Daily    timolol (Timoptic) 0.25 % ophthalmic solution INSTILL 1 DROP INTO LEFT EYE ONCE DAILY     Many issues with compliance:   Currently giving Toujeo 25 to 35 units daily, pt giving whatever she feels is appropriate  Has not started weekly Glp-1.  States that she has the medication but just has not started.  Medication originally prescribed 3 months ago.  Pt reports that her  sugars have been higher so she went to [facility] and purchased Novolin R.  She has been using this when she feels her sugars are too high.        Pt denies SE/intolerances  Reviewed all medications by prescribing practitioner (such as prescriptions, OTCs, herbal therapies, supplements) and documented in the medical record.     Reviewed need for secondary prevention: Statins, ACE-I/ARB, Aspirin    Glucose Monitoring  Patient is using CGM, Rebecca.  Report reviewed with patient.  See attached documents.  Name: Denia Tong  YOB: 1945  Report Period: 01/21/2025 - 02/03/2025 (14 days)  Generated: 02/03/2025  Time CGM Active: 41%    Glucose Statistics and Targets  Average Glucose: 202 mg/dL  Glucose Management Indicator (GMI): 8.1%  Glucose Variability (%CV): 41.2%  Target Range: 70 - 180 mg/dL    Time in Ranges  Very High: >250 mg/dL --- 27%  High: 181 - 250 mg/dL --- 20%  Target Range: 70 - 180 mg/dL --- 53%  Low: 54 - 69 mg/dL --- 0%  Very Low: <54 mg/dL --- 0%      Lifestyle:  Current diet: poorly controlled, pt knows reasonable CHO limits  Current exercise: no regular exercise    Last Labs/Vitals/Meds  Hemoglobin A1C (%)   Date Value   09/17/2024 7.3 (H)   02/15/2024 9.9 (H)   05/26/2023 8.6 (H)   08/05/2020 10.5 (H)   07/15/2019 12.3 (H)   02/23/2018 10.3 (H)     Glucose (mg/dL)   Date Value   09/17/2024 65 (L)     Creatinine (mg/dL)   Date Value   09/17/2024 0.89     eGFR (mL/min/1.73m*2)   Date Value   09/17/2024 66       BP Readings from Last 3 Encounters:   09/20/24 (!) 148/92   06/27/24 140/82   06/09/24 177/71        Wt Readings from Last 6 Encounters:   09/20/24 105 kg (232 lb)   06/27/24 106 kg (234 lb)   06/09/24 105 kg (232 lb 9.4 oz)   04/09/24 106 kg (233 lb 11 oz)   03/26/24 106 kg (233 lb 11 oz)   03/22/24 106 kg (233 lb 14.5 oz)     BMI Readings from Last 6 Encounters:   09/20/24 41.76 kg/m²   06/27/24 42.12 kg/m²   06/09/24 42.54 kg/m²   04/09/24 42.74 kg/m²   03/26/24 42.74 kg/m²    03/22/24 42.78 kg/m²       Assessment:  Patients diabetes is poorly controlled with most recent A1c of 7.3% (Goal < 7%).  Was 9.9% Compliance at present is estimated to be poor  Discussed starting Mounjaro. Pt states that she will start medication soon  Discussed risk of hypoglycemia with Novolin R insulin.  Instructed patient to stop using this immediately   Discussed need for consistent dose, basal insulin       Plan:  START Mounjaro 2.5mg once weekly   Give Toujeo 30 units daily, do not change dose   DO NOT use Novolin R (fast acting)  4.  Follow up on 2/27/25 with Lilliana        Data reviewed and evaluated by LEWIS Garcia RPH, CDCES  Treatment and plan changes discussed with JANKI Cody-CNP

## 2025-02-03 NOTE — Clinical Note
FYI, I don't think you have seen this patient yet.  I have been working with her for a while.  Honestly I think at this point she needs to be referred to Endocrinology.

## 2025-02-04 ENCOUNTER — TELEPHONE (OUTPATIENT)
Dept: PHARMACY | Facility: HOSPITAL | Age: 80
End: 2025-02-04

## 2025-02-04 RX ORDER — TIRZEPATIDE 2.5 MG/.5ML
INJECTION, SOLUTION SUBCUTANEOUS
COMMUNITY

## 2025-02-05 NOTE — TELEPHONE ENCOUNTER
I reviewed the telephone encounter and agree with the student’s findings and plans as written. Case discussed with student.    Val Panda, PharmD

## 2025-02-27 ENCOUNTER — CLINICAL SUPPORT (OUTPATIENT)
Dept: PRIMARY CARE | Facility: CLINIC | Age: 80
End: 2025-02-27
Payer: MEDICARE

## 2025-02-27 ENCOUNTER — APPOINTMENT (OUTPATIENT)
Dept: PRIMARY CARE | Facility: CLINIC | Age: 80
End: 2025-02-27
Payer: MEDICARE

## 2025-02-27 VITALS
DIASTOLIC BLOOD PRESSURE: 80 MMHG | BODY MASS INDEX: 42.29 KG/M2 | HEIGHT: 62 IN | OXYGEN SATURATION: 97 % | SYSTOLIC BLOOD PRESSURE: 138 MMHG | WEIGHT: 229.8 LBS | TEMPERATURE: 97.9 F | HEART RATE: 82 BPM

## 2025-02-27 DIAGNOSIS — E11.9 TYPE 2 DIABETES MELLITUS WITHOUT COMPLICATION, WITH LONG-TERM CURRENT USE OF INSULIN (MULTI): ICD-10-CM

## 2025-02-27 DIAGNOSIS — M17.12 LOCALIZED OSTEOARTHRITIS OF LEFT KNEE: ICD-10-CM

## 2025-02-27 DIAGNOSIS — F33.1 MODERATE EPISODE OF RECURRENT MAJOR DEPRESSIVE DISORDER: ICD-10-CM

## 2025-02-27 DIAGNOSIS — E78.2 MIXED HYPERLIPIDEMIA: ICD-10-CM

## 2025-02-27 DIAGNOSIS — Z00.00 ENCOUNTER FOR MEDICARE ANNUAL WELLNESS EXAM: Primary | ICD-10-CM

## 2025-02-27 DIAGNOSIS — E11.9 TYPE 2 DIABETES MELLITUS WITHOUT COMPLICATION, WITH LONG-TERM CURRENT USE OF INSULIN (MULTI): Primary | ICD-10-CM

## 2025-02-27 DIAGNOSIS — Z79.4 TYPE 2 DIABETES MELLITUS WITHOUT COMPLICATION, WITH LONG-TERM CURRENT USE OF INSULIN (MULTI): ICD-10-CM

## 2025-02-27 DIAGNOSIS — I10 PRIMARY HYPERTENSION: ICD-10-CM

## 2025-02-27 DIAGNOSIS — Z79.4 TYPE 2 DIABETES MELLITUS WITHOUT COMPLICATION, WITH LONG-TERM CURRENT USE OF INSULIN (MULTI): Primary | ICD-10-CM

## 2025-02-27 PROBLEM — E66.01 OBESITY, MORBID (MULTI): Status: ACTIVE | Noted: 2025-02-27

## 2025-02-27 RX ORDER — TIRZEPATIDE 2.5 MG/.5ML
INJECTION, SOLUTION SUBCUTANEOUS
Status: CANCELLED | OUTPATIENT
Start: 2025-02-27

## 2025-02-27 RX ORDER — ALENDRONATE SODIUM 70 MG/1
70 TABLET ORAL
Qty: 4 TABLET | Refills: 11 | Status: CANCELLED | OUTPATIENT
Start: 2025-02-27 | End: 2026-02-27

## 2025-02-27 RX ORDER — FLASH GLUCOSE SENSOR
KIT MISCELLANEOUS
Qty: 6 EACH | Refills: 3 | Status: SHIPPED | OUTPATIENT
Start: 2025-02-27

## 2025-02-27 RX ORDER — TIMOLOL MALEATE 2.5 MG/ML
SOLUTION/ DROPS OPHTHALMIC
Status: CANCELLED | OUTPATIENT
Start: 2025-02-27

## 2025-02-27 RX ORDER — LOSARTAN POTASSIUM 50 MG/1
50 TABLET ORAL DAILY
Qty: 90 TABLET | Refills: 1 | Status: SHIPPED | OUTPATIENT
Start: 2025-02-27

## 2025-02-27 RX ORDER — SERTRALINE HYDROCHLORIDE 100 MG/1
200 TABLET, FILM COATED ORAL DAILY
Qty: 180 TABLET | Refills: 1 | Status: SHIPPED | OUTPATIENT
Start: 2025-02-27

## 2025-02-27 RX ORDER — INSULIN GLARGINE 300 [IU]/ML
40 INJECTION, SOLUTION SUBCUTANEOUS DAILY
Qty: 6 ML | Refills: 3 | Status: SHIPPED | OUTPATIENT
Start: 2025-02-27

## 2025-02-27 RX ORDER — PRAVASTATIN SODIUM 40 MG/1
40 TABLET ORAL DAILY
Qty: 90 TABLET | Refills: 1 | Status: SHIPPED | OUTPATIENT
Start: 2025-02-27

## 2025-02-27 ASSESSMENT — PATIENT HEALTH QUESTIONNAIRE - PHQ9
10. IF YOU CHECKED OFF ANY PROBLEMS, HOW DIFFICULT HAVE THESE PROBLEMS MADE IT FOR YOU TO DO YOUR WORK, TAKE CARE OF THINGS AT HOME, OR GET ALONG WITH OTHER PEOPLE: SOMEWHAT DIFFICULT
SUM OF ALL RESPONSES TO PHQ9 QUESTIONS 1 AND 2: 2
1. LITTLE INTEREST OR PLEASURE IN DOING THINGS: SEVERAL DAYS
2. FEELING DOWN, DEPRESSED OR HOPELESS: SEVERAL DAYS

## 2025-02-27 ASSESSMENT — COLUMBIA-SUICIDE SEVERITY RATING SCALE - C-SSRS
1. IN THE PAST MONTH, HAVE YOU WISHED YOU WERE DEAD OR WISHED YOU COULD GO TO SLEEP AND NOT WAKE UP?: NO
2. HAVE YOU ACTUALLY HAD ANY THOUGHTS OF KILLING YOURSELF?: NO
6. HAVE YOU EVER DONE ANYTHING, STARTED TO DO ANYTHING, OR PREPARED TO DO ANYTHING TO END YOUR LIFE?: NO

## 2025-02-27 NOTE — PROGRESS NOTES
"Subjective   Reason for Visit: Denia Tong is an 79 y.o. female here for a Medicare Wellness visit.     Past Medical, Surgical, and Family History reviewed and updated in chart.    Reviewed all medications by prescribing practitioner or clinical pharmacist (such as prescriptions, OTCs, herbal therapies and supplements) and documented in the medical record.    HPI    Denia presents for her medicare wellness exam. She is a new patient to this provider, previously a patient of [facility].  No new concerns at this visit.  No recent hospitalizations or illnesses.    Chronic conditions:  HTN  HLD  DM  Depression  Osteoporosis  Osteoarthritis  Glaucoma    Patient Care Team:  JANKI Cody-CNP as PCP - General (Family Medicine)  Lilliana Garcia RPh as Pharmacist (Pharmacy)     Review of Systems  GENERAL - Denies fever/chills, recent illness, unexplained weight loss  HEENT- Denies change in vision, double vision, blurred. Denies hearing changes, ear pain. Denies nose bleeds. Denies sore throat, difficulty swallowing.    RESP - Denies SOB or cough  CVS - Denies CP, palpitations  GI - Denies nausea or abdominal pain, hematochezia/melena  - Denies urinary frequency, urgency or incontinence.  Denies nocturia.   NEURO - Denies headache, dizziness  MSK - Denies joint, neck or back pain  Skin - Denies abnormal lesions, rash  PSYCH-Denies anxiety, depression, changes in mood        Objective   Vitals:  /80 (Patient Position: Sitting, BP Cuff Size: Adult)   Pulse 82   Temp 36.6 °C (97.9 °F) (Temporal)   Ht 1.575 m (5' 2\")   Wt 104 kg (229 lb 12.8 oz)   LMP 01/01/1990 (Approximate)   SpO2 97%   BMI 42.03 kg/m²       Physical Exam  Pt is A and O x3, NAD  Head- normocephalic and atraumatic,   EYES- conjunctiva- normal, JUANPABLO, lids- normal  EARS/NOSE- TM's normal, nasopharynx- normal and atraumatic  OROPHARYNX- normal  NECK- supple, FROM  THYROID- NT, normal size, no nodule noted  LYMPH- no cervical lymph nodes palpated "   CV- RRR without murmur  PULM- CTA bilaterally, normal respiratory effort  RESPIRATORY EFFORT- normal , no retractions or nasal flaring   ABD- normoactive BS's , soft , NT, no hepatosplenomegaly palpated  EXT- no edema, non-tender. 5/5 strength, equal bilaterally upper and lower extremities  SKIN- no abnormal skin lesions noted  NEURO- no focal deficits  PSYCH- pleasant, normal judgement and insight    Assessment & Plan  Encounter for Medicare annual wellness exam  Well adult exam.  1. Age appropriate preventative measures reviewed.   2. Encouraged healthy diet and exercise.  3. Immunizations- Reviewed, due for Tdap-obtain at local pharmacy or health department as discussed  4. Labs-ordered prior to visit but not yet obtained, will follow-up once obtained  5. Medications- Reviewed    *Follow-up in 1 year for repeat annual physical exam. Patient verbalizes understanding  regarding plan of care and all questions answered.         Type 2 diabetes mellitus without complication, with long-term current use of insulin (Multi)  Continue current medication.  Continue work on diet - recommend lots of fruits and vegetables, lean protein like chicken, turkey, fish, beans and Greek yogurt. Try to choose healthier carbohydrate options like oatmeal, wheat bread and pasta, sweet potatoes. Limit sugary treats.  Check a fasting sugar first thing in the AM three - four times daily and keep a log of the results to bring to your next office visit.  Please contact office if your sugars are consistently >140.  Reevaluate in 3 months.     Orders:    flash glucose sensor kit (FreeStyle Rebecca 2 Sensor) kit; Change sensor every 14 days as directed    insulin glargine (Toujeo SoloStar U-300 Insulin) 300 unit/mL (1.5 mL) injection; Inject 40 Units under the skin once daily.    Referral to Podiatry; Future    Primary hypertension  Chronic condition, stable at this visit  Continue losartan 50 mg daily as prescribed.  Monitor home blood  pressures.  Call if blood pressure consistently >140/90.  Non pharmacological interventions such as lowering salt, saturated fats, cholesterol, and sugar diet discussed.  Increase physical activity, aim for 30 minutes 5 days per week as able.  Stress reduction interventions discussed.  Discussed signs and symptoms of major cardiovascular event and need to present to the ED.   Reevaluate in 6 months.    Orders:    losartan (Cozaar) 50 mg tablet; Take 1 tablet (50 mg) by mouth once daily.    Mixed hyperlipidemia  Chronic condition, stable with medication  Continue pravastatin 40 mg daily  Labs: Ordered prior to visit, not yet obtained, will follow-up with results for further intervention once completed  Reports any new onset of muscle pain or weakness.  Continue with health diet low in saturated fats, cholesterol, sodium, and limit sugars.  Exercise 30-45 minutes 5 days per week.  Follow up in 6 months.     Orders:    pravastatin (Pravachol) 40 mg tablet; Take 1 tablet (40 mg) by mouth once daily.    Moderate episode of recurrent major depressive disorder  Chronic condition, stable.  Continue sertraline 200 mg daily as prescribed.  Continue with non-pharmacological interventions including:  -7-9 hours of sleep   -healthy diet  -exercise 30 minutes 5 days per week  - consider counseling, CBT as adjunct to medication.  Follow up in 6 months.    Orders:    sertraline (Zoloft) 100 mg tablet; Take 2 tablets (200 mg) by mouth once daily.    Localized osteoarthritis of left knee    Orders:    Disability Placard            Patient was identified as a fall risk. Risk prevention instructions provided.

## 2025-02-27 NOTE — ASSESSMENT & PLAN NOTE
Continue current medication.  Continue work on diet - recommend lots of fruits and vegetables, lean protein like chicken, turkey, fish, beans and Greek yogurt. Try to choose healthier carbohydrate options like oatmeal, wheat bread and pasta, sweet potatoes. Limit sugary treats.  Check a fasting sugar first thing in the AM three - four times daily and keep a log of the results to bring to your next office visit.  Please contact office if your sugars are consistently >140.  Reevaluate in 3 months.     Orders:    flash glucose sensor kit (FreeStyle Rebecca 2 Sensor) kit; Change sensor every 14 days as directed    insulin glargine (Toujeo SoloStar U-300 Insulin) 300 unit/mL (1.5 mL) injection; Inject 40 Units under the skin once daily.    Referral to Podiatry; Future

## 2025-02-27 NOTE — PROGRESS NOTES
DM FOLLOW UP  E11.9    Denia Tong is a 79 y.o. female here today at the request of Referring Provider: ADAM Cody for my opinion regarding diabetes management.  My final recommendations will be communicated back to the requesting provider by way of shared medical record.     Patient here today for CGM download and OV with new PCP.  She brings her Rebecca reader as well as her first dose of Mounjaro 2.5mg.  Patient states that she has not been taking her Toujeo daily due to forgetfulness.        Subjective   Past Medical History:  She has a past medical history of Cataracts, bilateral, DM (diabetes mellitus) (Multi), High cholesterol, History of right shoulder fracture, Hypertension, Left knee pain, and Thoracic spine fracture (Multi).    Social History:  She reports that she quit smoking about 32 years ago. Her smoking use included cigarettes. She has never used smokeless tobacco. She reports that she does not drink alcohol and does not use drugs.    Allergies:  Penicillin, Clindamycin, Ethyl alcohol, and Phenol    CURRENT PHARMACOTHERAPY   Current Outpatient Medications   Medication Instructions    alendronate (FOSAMAX) 70 mg, oral, Every 7 days, Take in the morning with a full glass of water, on an empty stomach, and do not take anything else by mouth or lie down for the next 30 min.    flash glucose sensor kit (FreeStyle Rebecca 2 Sensor) kit Change sensor every 14 days as directed    insulin glargine (TOUJEO SOLOSTAR U-300 INSULIN) 50 Units, subcutaneous, Daily    losartan (COZAAR) 50 mg, oral, Daily    pravastatin (PRAVACHOL) 40 mg, oral, Daily    prednisoLONE acetate (Pred-Forte) 1 % ophthalmic suspension INSTILL 1 DROP INTO LEFT EYE SIX TIMES DAILY    sertraline (ZOLOFT) 200 mg, oral, Daily    timolol (Timoptic) 0.25 % ophthalmic solution INSTILL 1 DROP INTO LEFT EYE ONCE DAILY    tirzepatide (Mounjaro) 2.5 mg/0.5 mL pen injector subcutaneous     Pt denies SE/intolerances  Reviewed all  medications by prescribing practitioner (such as prescriptions, OTCs, herbal therapies, supplements) and documented in the medical record.     Reviewed need for secondary prevention: Statins, ACE-I/ARB, Aspirin    Glucose Monitoring  Patient is using CGMRebecca.  Report reviewed with patient.  See attached documents.  Name: Denia Tong  YOB: 1945  Report Period: 02/14/2025 - 02/27/2025 (14 days)  Generated: 02/27/2025  Time CGM Active: 69%    Glucose Statistics and Targets  Average Glucose: 200 mg/dL  Glucose Management Indicator (GMI): 8.1%  Glucose Variability (%CV): 41.2%  Target Range: 70 - 180 mg/dL    Time in Ranges  Very High: >250 mg/dL --- 28%  High: 181 - 250 mg/dL --- 28%  Target Range: 70 - 180 mg/dL --- 41%  Low: 54 - 69 mg/dL --- 3%  Very Low: <54 mg/dL --- 0%      Lifestyle:  Current diet: poorly controlled, pt knows reasonable CHO limits  Current exercise: no regular exercise    Last Labs/Vitals/Meds  Hemoglobin A1C (%)   Date Value   09/17/2024 7.3 (H)   02/15/2024 9.9 (H)   05/26/2023 8.6 (H)   08/05/2020 10.5 (H)   07/15/2019 12.3 (H)   02/23/2018 10.3 (H)     Glucose (mg/dL)   Date Value   09/17/2024 65 (L)     Creatinine (mg/dL)   Date Value   09/17/2024 0.89     eGFR (mL/min/1.73m*2)   Date Value   09/17/2024 66       BP Readings from Last 3 Encounters:   09/20/24 (!) 148/92   06/27/24 140/82   06/09/24 177/71        Wt Readings from Last 6 Encounters:   02/03/25 104 kg (230 lb)   09/20/24 105 kg (232 lb)   06/27/24 106 kg (234 lb)   06/09/24 105 kg (232 lb 9.4 oz)   04/09/24 106 kg (233 lb 11 oz)   03/26/24 106 kg (233 lb 11 oz)     BMI Readings from Last 6 Encounters:   02/03/25 41.37 kg/m²   09/20/24 41.76 kg/m²   06/27/24 42.12 kg/m²   06/09/24 42.54 kg/m²   04/09/24 42.74 kg/m²   03/26/24 42.74 kg/m²       Assessment:  Patients diabetes is poorly controlled with last A1C completed in 9/2024.  Compliance at present is estimated to be poor  Discussed decrease Toujeo to 40  units daily due to nocturnal hypoglycemia.  Pt states that she misses her dose approx 2 times per week.  Discussed ways to remember to take the dose so that we can get consistent results.     Completed first dose of Mounjaro in office today.   Follow up in 3-4 weeks        Plan:  1.  Decrease Toujeo to 40 units daily.  Be sure to take insulin every day without missing or skipping doses.   2.  Continue all other medications at current dosages  3.  Follow up in 1 month with clinical pharmacist      Data reviewed and evaluated by LEWIS Garcia RPH, [facility]  Treatment and plan changes discussed with JANKI Cody-CNP

## 2025-02-27 NOTE — PATIENT INSTRUCTIONS
Your are due for Tdap (Tetanus, Diptheria, Pertussus? Vaccine, you may update at your local pharmacy Hyperlipiemia  Depression        Ways to Help Prevent Falls at Home    Quick Tips   ? Ask for help if you need it. Most people want to help!   ? Get up slowly after sitting or laying down   ? Wear a medical alert device or keep cell phone in your pocket   ? Use night lights, especially areas near a bathroom   ? Keep the items you use often within reach on a small stool or end table   ? Use an assistive device such as walker or cane, as directed by provider/physical therapy   ? Use a non-slip mat and grab bars in your bathroom. Look for home health sections for best options     Other Areas to Focus On   ? Exercise and nutrition: Regular exercise or taking a falls prevention class are great ways improve strength and balance. Don’t forget to stay hydrated and bring a snack!   ? Medicine side effects: Some medicines can make you sleepy or dizzy, which could cause a fall. Ask your healthcare provider about the side effects your medicines could cause. Be sure to let them know if you take any vitamins or supplements as well.   ? Tripping hazards: Remove items you could trip on, such as loose mats, rugs, cords, and clutter. Wear closed toe shoes with rubber soles.   ? Health and wellness: Get regular checkups with your healthcare provider, plus routine vision and hearing screenings. Talk with your healthcare provider about:   o Your medicines and the possible side effects - bring them in a bag if that is easier!   o Problems with balance or feeling dizzy   o Ways to promote bone health, such as Vitamin D and calcium supplements   o Questions or concerns about falling     *Ask your healthcare team if you have questions     ©[facility], 2022

## 2025-02-27 NOTE — ASSESSMENT & PLAN NOTE
Chronic condition, stable with medication  Continue pravastatin 40 mg daily  Labs: Ordered prior to visit, not yet obtained, will follow-up with results for further intervention once completed  Reports any new onset of muscle pain or weakness.  Continue with health diet low in saturated fats, cholesterol, sodium, and limit sugars.  Exercise 30-45 minutes 5 days per week.  Follow up in 6 months.     Orders:    pravastatin (Pravachol) 40 mg tablet; Take 1 tablet (40 mg) by mouth once daily.

## 2025-02-27 NOTE — ASSESSMENT & PLAN NOTE
Chronic condition, stable at this visit  Continue losartan 50 mg daily as prescribed.  Monitor home blood pressures.  Call if blood pressure consistently >140/90.  Non pharmacological interventions such as lowering salt, saturated fats, cholesterol, and sugar diet discussed.  Increase physical activity, aim for 30 minutes 5 days per week as able.  Stress reduction interventions discussed.  Discussed signs and symptoms of major cardiovascular event and need to present to the ED.   Reevaluate in 6 months.    Orders:    losartan (Cozaar) 50 mg tablet; Take 1 tablet (50 mg) by mouth once daily.

## 2025-03-28 ENCOUNTER — APPOINTMENT (OUTPATIENT)
Dept: PRIMARY CARE | Facility: CLINIC | Age: 80
End: 2025-03-28
Payer: MEDICARE

## 2025-04-25 ENCOUNTER — TELEPHONE (OUTPATIENT)
Dept: PRIMARY CARE | Facility: CLINIC | Age: 80
End: 2025-04-25
Payer: MEDICARE

## 2025-04-25 DIAGNOSIS — Z79.4 TYPE 2 DIABETES MELLITUS WITHOUT COMPLICATION, WITH LONG-TERM CURRENT USE OF INSULIN: ICD-10-CM

## 2025-04-25 DIAGNOSIS — E11.9 TYPE 2 DIABETES MELLITUS WITHOUT COMPLICATION, WITH LONG-TERM CURRENT USE OF INSULIN: ICD-10-CM

## 2025-04-25 RX ORDER — INSULIN GLARGINE 300 [IU]/ML
40 INJECTION, SOLUTION SUBCUTANEOUS DAILY
Qty: 6 ML | Refills: 3 | Status: SHIPPED | OUTPATIENT
Start: 2025-04-25

## 2025-04-25 NOTE — TELEPHONE ENCOUNTER
[facility] called because the patient's Insulin Glargine was rejected by insurance this month. The pharmacy is asking for the prescription to be resent and they will try to bill for Toujeo. Otherwise, a PA may be needed or to switch to an alternative.  Pharmacy: [facility] [place]

## 2025-05-29 ENCOUNTER — APPOINTMENT (OUTPATIENT)
Dept: PRIMARY CARE | Facility: CLINIC | Age: 80
End: 2025-05-29
Payer: MEDICARE

## 2025-07-02 ENCOUNTER — TELEPHONE (OUTPATIENT)
Dept: PHARMACY | Facility: HOSPITAL | Age: 80
End: 2025-07-02
Payer: MEDICARE

## 2025-07-02 NOTE — TELEPHONE ENCOUNTER
PATIENT HAS CALLED FOR A REFILL ON methadone (DOLOPHINE) 5 MG tablet.   MAC IN [place] CONFIRMED.   PLEASE CALL  PATIENT FOR ANY QUESTIONS OR CONCERNS AT   [phone]    Population Health: Outreach by Ambulatory Pharmacy Team    Patient: Denia Tong  Primary Care Provider (PCP): ADAM Cody  Payor: Lety JUAREZ  Reason: Adherence  Medication(s): Losartan 50mg, Pravastatin 40mg  Outcome: Patient Reached: Will Refill, Patient is aware she is due for refill and will go to their [facility] to  their prescriptions. Patient has no troubles getting to the pharmacy or paying for medications. No other barriers identified.     PENG NUNN

## 2025-07-14 DIAGNOSIS — Z79.4 TYPE 2 DIABETES MELLITUS WITHOUT COMPLICATION, WITH LONG-TERM CURRENT USE OF INSULIN: Primary | ICD-10-CM

## 2025-07-14 DIAGNOSIS — E11.9 TYPE 2 DIABETES MELLITUS WITHOUT COMPLICATION, WITH LONG-TERM CURRENT USE OF INSULIN: Primary | ICD-10-CM

## 2025-07-22 ENCOUNTER — APPOINTMENT (OUTPATIENT)
Dept: PHARMACY | Facility: HOSPITAL | Age: 80
End: 2025-07-22
Payer: MEDICARE

## 2025-07-22 DIAGNOSIS — E11.40 TYPE 2 DIABETES MELLITUS WITH DIABETIC NEUROPATHY, WITH LONG-TERM CURRENT USE OF INSULIN: Primary | ICD-10-CM

## 2025-07-22 DIAGNOSIS — Z79.4 TYPE 2 DIABETES MELLITUS WITH DIABETIC NEUROPATHY, WITH LONG-TERM CURRENT USE OF INSULIN: Primary | ICD-10-CM

## 2025-07-22 NOTE — PROGRESS NOTES
MEDICATION MANAGEMENT    Denia Tong is a 79 y.o. female who was referred by ADAM Cody to complete medication reconciliation and review with the clinical pharmacist.  A comprehensive medication review was completed with the patient via telephone today.  With patient's permission, this is a Telemedicine visit with audio. Provider located at office address. Patient located at their home address. All issues as documented below were discussed and addressed but limited physical exam was performed. If it was felt that the patient should be evaluated via face-to-face office appointment(s) they were directed to appropriate location.  Patient reports financial barrier with current medication.      MEDICATION HISTORY  Allergies[1]  Medications Ordered Prior to Encounter[2]     Patient Assistance Screening (VAF)  Patient verbally reports monthly or yearly income which is less than 400% federal poverty level.  Application for program has been submitted for the following medications:     Toujeo, Mounjaro     Patient has been informed that program team will be reaching out to them to discuss necessary documentation, instructed to answer phone/return voicemail.   Patient aware this process may take up to 6 weeks.   If approved medication must be filled through [facility] pharmacy and may be picked up or mailed to patient.       LABS  LMP 01/01/1990 (Approximate)    Lab Results   Component Value Date    HGBA1C 7.3 (H) 09/17/2024    HGBA1C 9.9 (H) 02/15/2024    HGBA1C 8.6 (H) 05/26/2023     Lab Results   Component Value Date    BILITOT 0.6 09/17/2024    CALCIUM 9.0 09/17/2024    CO2 27 09/17/2024     09/17/2024    CREATININE 0.89 09/17/2024    GLUCOSE 65 (L) 09/17/2024    ALKPHOS 58 09/17/2024    K 4.5 09/17/2024    PROT 7.1 09/17/2024     09/17/2024    AST 14 09/17/2024    ALT 8 09/17/2024    BUN 19 09/17/2024    ANIONGAP 13 09/17/2024    ALBUMIN 4.0 09/17/2024     Lab Results   Component Value Date     TRIG 127 09/17/2024    CHOL 191 09/17/2024    LDLCALC 124 (H) 09/17/2024    HDL 42.1 09/17/2024         Assessment/Plan    Comments/Recommendations to PCP:  Reconciled medications with patient via telephone today.  Reviewed instructions, MOA, SE and dose for Toujeo, Mounjaro    Patient verbalizes understanding regarding plan of care and all questions answered   4.   Pt will follow up with PCP as scheduled        [facility]illiana Garcia [facility]    Verbal consent to manage patient's drug therapy was obtained from the patient and/or an individual authorized to act on behalf of a patient. They were informed they may decline to participate or withdraw from participation in pharmacy services at any time.       [1]   Allergies  Allergen Reactions    Penicillin Anaphylaxis and Unknown    Clindamycin Hives    Ethyl Alcohol Unknown    Phenol Hives and Unknown   [2]   Current Outpatient Medications on File Prior to Visit   Medication Sig Dispense Refill    alendronate (Fosamax) 70 mg tablet Take 1 tablet (70 mg) by mouth every 7 days. Take in the morning with a full glass of water, on an empty stomach, and do not take anything else by mouth or lie down for the next 30 min. 4 tablet 11    flash glucose sensor kit (FreeStyle Rebecca 2 Sensor) kit Change sensor every 14 days as directed 6 each 3    insulin glargine (Toujeo SoloStar U-300 Insulin) 300 unit/mL (1.5 mL) pen Inject 40 Units under the skin once daily. 6 mL 3    losartan (Cozaar) 50 mg tablet Take 1 tablet (50 mg) by mouth once daily. 90 tablet 1    pravastatin (Pravachol) 40 mg tablet Take 1 tablet (40 mg) by mouth once daily. 90 tablet 1    prednisoLONE acetate (Pred-Forte) 1 % ophthalmic suspension INSTILL 1 DROP INTO LEFT EYE SIX TIMES DAILY      sertraline (Zoloft) 100 mg tablet Take 2 tablets (200 mg) by mouth once daily. 180 tablet 1    timolol (Timoptic) 0.25 % ophthalmic solution INSTILL 1 DROP INTO LEFT EYE ONCE DAILY      tirzepatide (Mounjaro) 2.5 mg/0.5 mL pen  injector Inject under the skin.       No current facility-administered medications on file prior to visit.

## 2025-07-22 NOTE — PROGRESS NOTES
Please review for internal [employer] (VAF) eligibility. Prescriptions have been sent to [facility].     Denia FRANCES Alycia  1945   [id number]  [phone]   [email or web address]  Delivery Preference (if known): MAIL  Priority:  Hold Medication until [facility] approved/denied  Filing Status: Patient does not file taxes  Household size: 1  Financial Documents To Be Collected By: Documents attached to email  Medication(s):    Toujeo, Mounjaro     *I have confirmed the above medications are listed on the current VAF formulary: [email or web address]    I acknowledge the [facility] staff will further reach out to the patient to confirm additional details as needed, including but not limited to collecting financial documentation, confirming delivery information, obtaining payment method for non-VAF medications.      Lilliana Garcia [facility]